# Patient Record
Sex: FEMALE | Race: WHITE | NOT HISPANIC OR LATINO | Employment: UNEMPLOYED | URBAN - METROPOLITAN AREA
[De-identification: names, ages, dates, MRNs, and addresses within clinical notes are randomized per-mention and may not be internally consistent; named-entity substitution may affect disease eponyms.]

---

## 2017-03-15 ENCOUNTER — HOSPITAL ENCOUNTER (OUTPATIENT)
Dept: RADIOLOGY | Facility: HOSPITAL | Age: 47
Discharge: HOME | End: 2017-03-15
Attending: NURSE PRACTITIONER
Payer: COMMERCIAL

## 2017-03-15 DIAGNOSIS — G35 MULTIPLE SCLEROSIS: ICD-10-CM

## 2017-03-15 PROCEDURE — 70553 MRI BRAIN STEM W/O & W/DYE: CPT | Mod: 26,,, | Performed by: RADIOLOGY

## 2017-03-15 PROCEDURE — 70553 MRI BRAIN STEM W/O & W/DYE: CPT | Mod: TC

## 2017-03-15 PROCEDURE — 25500020 PHARM REV CODE 255: Performed by: NURSE PRACTITIONER

## 2017-03-15 PROCEDURE — A9585 GADOBUTROL INJECTION: HCPCS | Performed by: NURSE PRACTITIONER

## 2017-03-15 RX ORDER — GADOBUTROL 604.72 MG/ML
6 INJECTION INTRAVENOUS
Status: COMPLETED | OUTPATIENT
Start: 2017-03-15 | End: 2017-03-15

## 2017-03-15 RX ADMIN — GADOBUTROL 6 ML: 604.72 INJECTION INTRAVENOUS at 09:03

## 2017-03-31 ENCOUNTER — OFFICE VISIT (OUTPATIENT)
Dept: OBSTETRICS AND GYNECOLOGY | Facility: CLINIC | Age: 47
End: 2017-03-31
Payer: COMMERCIAL

## 2017-03-31 VITALS
RESPIRATION RATE: 13 BRPM | HEART RATE: 70 BPM | WEIGHT: 154 LBS | DIASTOLIC BLOOD PRESSURE: 80 MMHG | HEIGHT: 64 IN | SYSTOLIC BLOOD PRESSURE: 122 MMHG | BODY MASS INDEX: 26.29 KG/M2

## 2017-03-31 DIAGNOSIS — N92.0 MENORRHAGIA WITH REGULAR CYCLE: Primary | ICD-10-CM

## 2017-03-31 PROCEDURE — 99204 OFFICE O/P NEW MOD 45 MIN: CPT | Mod: S$GLB,,, | Performed by: OBSTETRICS & GYNECOLOGY

## 2017-03-31 PROCEDURE — 1160F RVW MEDS BY RX/DR IN RCRD: CPT | Mod: S$GLB,,, | Performed by: OBSTETRICS & GYNECOLOGY

## 2017-03-31 PROCEDURE — 99999 PR PBB SHADOW E&M-EST. PATIENT-LVL III: CPT | Mod: PBBFAC,,, | Performed by: OBSTETRICS & GYNECOLOGY

## 2017-03-31 RX ORDER — NORGESTIMATE AND ETHINYL ESTRADIOL 0.25-0.035
KIT ORAL
Qty: 28 TABLET | Refills: 0 | Status: SHIPPED | OUTPATIENT
Start: 2017-03-31 | End: 2017-09-05

## 2017-03-31 RX ORDER — ZOLPIDEM TARTRATE 10 MG/1
0.5 TABLET ORAL NIGHTLY PRN
COMMUNITY
Start: 2017-03-08 | End: 2020-06-05 | Stop reason: SDUPTHER

## 2017-03-31 RX ORDER — PANTOPRAZOLE SODIUM 40 MG/1
1 TABLET, DELAYED RELEASE ORAL 2 TIMES DAILY
COMMUNITY
Start: 2017-03-08 | End: 2018-05-07

## 2017-03-31 NOTE — PROGRESS NOTES
Subjective:       Patient ID: Ruma Dietrich is a 47 y.o. female.    Chief Complaint:  Metrorrhagia (bleeding on/off 3 wks, heavy and light) and Hot Flashes      History of Present Illness  Patient presents complaining of irregular vaginal bleeding.  Patient states this episode is been going on for several months.  She states her periods are approximately 3 weeks apart usually about 20 days.  She states that bleeding typically will last 5 days and have 2-3 heavy days.  Patient states recently she has been bleeding for in excess of 7 days and her last cycle was only 2 weeks apart and she is currently still bleeding.  Patient states her menstrual problems have gone back since just after the birth of her child 3 years ago.  Patient states that prior to this delivery her periods were very regular like clockwork.  Patient is status post tubal ligation.  Patient states that she was having what she thought was hot flashes but had blood work done and has a normal FSH.    Menstrual History:  OB History      Para Term  AB TAB SAB Ectopic Multiple Living    4 2 2  1  1   2         Menarche age:   Patient's last menstrual period was 03/10/2017 (approximate).         Review of Systems  Review of Systems   Constitutional: Negative for activity change, appetite change, chills, diaphoresis, fatigue, fever and unexpected weight change.   HENT: Negative for congestion, dental problem, drooling, ear discharge, ear pain, facial swelling, hearing loss, mouth sores, nosebleeds, postnasal drip, rhinorrhea, sinus pressure, sneezing, sore throat, tinnitus, trouble swallowing and voice change.    Eyes: Negative for photophobia, pain, discharge, redness, itching and visual disturbance.   Respiratory: Negative for apnea, cough, choking, chest tightness, shortness of breath, wheezing and stridor.    Cardiovascular: Negative for chest pain, palpitations and leg swelling.   Gastrointestinal: Negative for abdominal distention,  abdominal pain, anal bleeding, blood in stool, constipation, diarrhea, nausea, rectal pain and vomiting.   Endocrine: Negative for cold intolerance, heat intolerance, polydipsia, polyphagia and polyuria.   Genitourinary: Negative for decreased urine volume, difficulty urinating, dyspareunia, dysuria, enuresis, flank pain, frequency, genital sores, hematuria, menstrual problem, pelvic pain, urgency, vaginal bleeding, vaginal discharge and vaginal pain.   Musculoskeletal: Positive for gait problem. Negative for arthralgias, back pain, joint swelling, myalgias, neck pain and neck stiffness.   Skin: Negative for color change, pallor, rash and wound.   Allergic/Immunologic: Negative for environmental allergies, food allergies and immunocompromised state.   Neurological: Negative for dizziness, tremors, seizures, syncope, facial asymmetry, speech difficulty, weakness, light-headedness, numbness and headaches.   Hematological: Negative for adenopathy. Does not bruise/bleed easily.   Psychiatric/Behavioral: Negative for agitation, behavioral problems, confusion, decreased concentration, dysphoric mood, hallucinations, self-injury, sleep disturbance and suicidal ideas. The patient is not nervous/anxious and is not hyperactive.            Objective:    Physical Exam   Constitutional: She is oriented to person, place, and time. She appears well-developed and well-nourished.   Neck: No thyromegaly present.   Cardiovascular: Normal rate and regular rhythm.    Pulmonary/Chest: Effort normal and breath sounds normal.   Abdominal: Soft. Bowel sounds are normal. She exhibits no mass. There is no tenderness. Hernia confirmed negative in the right inguinal area and confirmed negative in the left inguinal area.   Genitourinary: Uterus normal. Rectal exam shows no external hemorrhoid. No breast tenderness or discharge. Uterus is not enlarged and not tender. Cervix exhibits no motion tenderness, no discharge and no friability. Right  adnexum displays no mass, no tenderness and no fullness. Left adnexum displays no mass, no tenderness and no fullness. There is bleeding ( Moderate menstrual bleeding) in the vagina. No tenderness in the vagina. No foreign body in the vagina. No vaginal discharge found.   Musculoskeletal: Normal range of motion.   Lymphadenopathy:        Right: No inguinal adenopathy present.        Left: No inguinal adenopathy present.   Neurological: She is alert and oriented to person, place, and time. She has normal reflexes.   Skin: Skin is dry.   Psychiatric: She has a normal mood and affect. Her behavior is normal. Judgment and thought content normal.   Nursing note and vitals reviewed.        Assessment:        1. Menorrhagia with regular cycle               Plan:     patient was counseled on birth control pills versus ablation versus hysterectomy.  Patient is considering all options    Ruma was seen today for metrorrhagia and hot flashes.    Diagnoses and all orders for this visit:    Menorrhagia with regular cycle

## 2017-03-31 NOTE — MR AVS SNAPSHOT
[facility]  [address] [place] [id number]  Phone: [phone]                  Ruma Dietrich   3/31/2017 10:15 AM   Office Visit    Description:  Female : 1970   Provider:  Mathieu Guzman MD   Department:  [facility]           Reason for Visit     Metrorrhagia     Hot Flashes           Diagnoses this Visit        Comments    Menorrhagia with regular cycle    -  Primary            To Do List           Future Appointments        Provider Department Dept Phone    2017 9:00 AM Jose Eduardo Menon MD [facility] [phone]      Goals (5 Years of Data)     None       These Medications        Disp Refills Start End    norgestimate-ethinyl estradiol (SPRINTEC, 28,) 0.25-35 mg-mcg per tablet 28 tablet 0 3/31/2017     3 pills per day for 3 days then 2 pills per day for 3 days then 1 a day until the package is gone    Pharmacy: [id number] - [address] #: [phone]         [facility]     [facility] Nurse Care Line -  Assistance  Unless otherwise directed by your provider, please contact [facility], our nurse care line that is available for  assistance.     Registered nurses in the [facility] provide: appointment scheduling, clinical advisement, health education, and other advisory services.  Call: [phone] (toll free)               Medications           Message regarding Medications     Verify the changes and/or additions to your medication regime listed below are the same as discussed with your clinician today.  If any of these changes or additions are incorrect, please notify your healthcare provider.        START taking these NEW medications        Refills    norgestimate-ethinyl estradiol (SPRINTEC, 28,) 0.25-35 mg-mcg per tablet 0    Sig: 3 pills per day for 3 days then 2 pills per day for 3 days then 1 a day until the package is gone    Class: Normal           Verify  "that the below list of medications is an accurate representation of the medications you are currently taking.  If none reported, the list may be blank. If incorrect, please contact your healthcare provider. Carry this list with you in case of emergency.           Current Medications     norgestimate-ethinyl estradiol (SPRINTEC, 28,) 0.25-35 mg-mcg per tablet 3 pills per day for 3 days then 2 pills per day for 3 days then 1 a day until the package is gone    pantoprazole (PROTONIX) 40 MG tablet Take 1 tablet by mouth 2 (two) times daily.    zolpidem (AMBIEN) 10 mg Tab Take 1 tablet by mouth nightly as needed.           Clinical Reference Information           Your Vitals Were     BP Pulse Resp Height Weight Last Period    122/80 70 13 5' 3.5" (1.613 m) 69.9 kg (154 lb) 03/10/2017 (Approximate)    BMI                26.85 kg/m2          Blood Pressure          Most Recent Value    BP  122/80      Allergies as of 3/31/2017     Clarithromycin    Sulfa (Sulfonamide Antibiotics)    Penicillins      Immunizations Administered on Date of Encounter - 3/31/2017     None      [facility] Sign-Up     Activating your [facility] account is as easy as 1-2-3!     1) Visit [email or web address], select Sign Up Now, enter this activation code and your date of birth, then select Next.  [email or web address]  Expires: 5/15/2017 10:29 AM      2) Create a username and password to use when you visit [facility] in the future and select a security question in case you lose your password and select Next.    3) Enter your e-mail address and click Sign Up!    Additional Information  If you have questions, please e-mail [email or web address] or call [phone] to talk to our [facility] staff. Remember, [facility] is NOT to be used for urgent needs. For medical emergencies, dial 911.         Language Assistance Services     ATTENTION: Language assistance services are available, free of charge. Please call [phone].      ATENCIÓN: Si lazarus turner " a pagan disposición servicios gratuitos de asistencia lingüística. Llrebecca al [phone].     NAOMY Ý: N?u b?n nói Ti?ng Vi?t, có các d?ch v? h? tr? ngôn ng? mi?n phí dành cho b?n. G?i s? [phone].         [facility] complies with applicable Federal civil rights laws and does not discriminate on the basis of race, color, national origin, age, disability, or sex.

## 2017-08-31 ENCOUNTER — TELEPHONE (OUTPATIENT)
Dept: OBSTETRICS AND GYNECOLOGY | Facility: CLINIC | Age: 47
End: 2017-08-31

## 2017-08-31 DIAGNOSIS — N92.0 MENORRHAGIA WITH REGULAR CYCLE: Primary | ICD-10-CM

## 2017-08-31 NOTE — TELEPHONE ENCOUNTER
Pt is requesting an ablation.  Please place case request.  LOV if 3/31/2017 procedure was discussed.

## 2017-08-31 NOTE — TELEPHONE ENCOUNTER
----- Message from Addie Joseph MA sent at 8/31/2017  4:16 PM CDT -----  Contact: [phone]  Please call to schedule pre-op for Ablation with Dr Guzman.  Stated spoke to him about this at appt in March.  Please call her at [phone].  Stated she would like something either tomorrow 09/01/17 or Tuesday 09/05/17.  Thanks!

## 2017-09-05 ENCOUNTER — HOSPITAL ENCOUNTER (OUTPATIENT)
Dept: PREADMISSION TESTING | Facility: HOSPITAL | Age: 47
Discharge: HOME | End: 2017-09-05
Attending: OBSTETRICS & GYNECOLOGY
Payer: COMMERCIAL

## 2017-09-05 ENCOUNTER — OFFICE VISIT (OUTPATIENT)
Dept: OBSTETRICS AND GYNECOLOGY | Facility: CLINIC | Age: 47
End: 2017-09-05
Payer: COMMERCIAL

## 2017-09-05 ENCOUNTER — HOSPITAL ENCOUNTER (OUTPATIENT)
Dept: RADIOLOGY | Facility: HOSPITAL | Age: 47
Discharge: HOME | End: 2017-09-05
Attending: OBSTETRICS & GYNECOLOGY
Payer: COMMERCIAL

## 2017-09-05 ENCOUNTER — HOSPITAL ENCOUNTER (OUTPATIENT)
Dept: PULMONOLOGY | Facility: HOSPITAL | Age: 47
Discharge: HOME | End: 2017-09-05
Attending: OBSTETRICS & GYNECOLOGY
Payer: COMMERCIAL

## 2017-09-05 ENCOUNTER — ANESTHESIA EVENT (OUTPATIENT)
Dept: SURGERY | Facility: HOSPITAL | Age: 47
End: 2017-09-05
Payer: COMMERCIAL

## 2017-09-05 ENCOUNTER — LAB VISIT (OUTPATIENT)
Dept: LAB | Facility: HOSPITAL | Age: 47
End: 2017-09-05
Attending: OBSTETRICS & GYNECOLOGY
Payer: COMMERCIAL

## 2017-09-05 VITALS
HEIGHT: 63 IN | RESPIRATION RATE: 13 BRPM | WEIGHT: 156 LBS | DIASTOLIC BLOOD PRESSURE: 80 MMHG | SYSTOLIC BLOOD PRESSURE: 122 MMHG | BODY MASS INDEX: 27.64 KG/M2 | HEART RATE: 70 BPM

## 2017-09-05 VITALS — BODY MASS INDEX: 27.29 KG/M2 | WEIGHT: 154 LBS | HEIGHT: 63 IN

## 2017-09-05 DIAGNOSIS — Z01.818 PREOP TESTING: ICD-10-CM

## 2017-09-05 DIAGNOSIS — N92.0 MENORRHAGIA WITH REGULAR CYCLE: Primary | ICD-10-CM

## 2017-09-05 DIAGNOSIS — N92.0 MENORRHAGIA WITH REGULAR CYCLE: ICD-10-CM

## 2017-09-05 DIAGNOSIS — D64.9 ANEMIA, UNSPECIFIED TYPE: ICD-10-CM

## 2017-09-05 LAB
ABO + RH BLD: NORMAL
B-HCG UR QL: NEGATIVE
BASOPHILS # BLD AUTO: 0.07 K/UL
BASOPHILS NFR BLD: 0.8 %
BLD GP AB SCN CELLS X3 SERPL QL: NORMAL
DIFFERENTIAL METHOD: NORMAL
EOSINOPHIL # BLD AUTO: 0.1 K/UL
EOSINOPHIL NFR BLD: 1.5 %
ERYTHROCYTE [DISTWIDTH] IN BLOOD BY AUTOMATED COUNT: 12.7 %
HCT VFR BLD AUTO: 42.5 %
HGB BLD-MCNC: 14.4 G/DL
LYMPHOCYTES # BLD AUTO: 2.9 K/UL
LYMPHOCYTES NFR BLD: 33.8 %
MCH RBC QN AUTO: 30.8 PG
MCHC RBC AUTO-ENTMCNC: 33.9 G/DL
MCV RBC AUTO: 91 FL
MONOCYTES # BLD AUTO: 0.5 K/UL
MONOCYTES NFR BLD: 6 %
NEUTROPHILS # BLD AUTO: 4.9 K/UL
NEUTROPHILS NFR BLD: 57.9 %
PLATELET # BLD AUTO: 229 K/UL
PMV BLD AUTO: 11.3 FL
RBC # BLD AUTO: 4.67 M/UL
WBC # BLD AUTO: 8.51 K/UL

## 2017-09-05 PROCEDURE — 86850 RBC ANTIBODY SCREEN: CPT

## 2017-09-05 PROCEDURE — 71010 XR CHEST 1 VIEW PRE-OP: CPT | Mod: 26,,, | Performed by: RADIOLOGY

## 2017-09-05 PROCEDURE — 71010 XR CHEST 1 VIEW PRE-OP: CPT | Mod: TC

## 2017-09-05 PROCEDURE — 99999 PR PBB SHADOW E&M-EST. PATIENT-LVL III: CPT | Mod: PBBFAC,,, | Performed by: OBSTETRICS & GYNECOLOGY

## 2017-09-05 PROCEDURE — 36415 COLL VENOUS BLD VENIPUNCTURE: CPT

## 2017-09-05 PROCEDURE — 85025 COMPLETE CBC W/AUTO DIFF WBC: CPT

## 2017-09-05 PROCEDURE — 93005 ELECTROCARDIOGRAM TRACING: CPT

## 2017-09-05 PROCEDURE — 99499 UNLISTED E&M SERVICE: CPT | Mod: S$GLB,,, | Performed by: OBSTETRICS & GYNECOLOGY

## 2017-09-05 PROCEDURE — 86900 BLOOD TYPING SEROLOGIC ABO: CPT

## 2017-09-05 PROCEDURE — 81025 URINE PREGNANCY TEST: CPT

## 2017-09-05 RX ORDER — SODIUM CHLORIDE, SODIUM LACTATE, POTASSIUM CHLORIDE, CALCIUM CHLORIDE 600; 310; 30; 20 MG/100ML; MG/100ML; MG/100ML; MG/100ML
INJECTION, SOLUTION INTRAVENOUS CONTINUOUS
Status: CANCELLED | OUTPATIENT
Start: 2017-09-05

## 2017-09-05 NOTE — DISCHARGE INSTRUCTIONS
[facility]  Pre Admit Instructions    Day and Date of Procedure: Friday 9-8-2017      · Call your doctor if you become ill before your surgery  · Someone will call you between 1 p.m. And 5 p.m.the workday before the procedure to give you an arrival time       - Before 7 a.m. Enter through Emergency Room       - 7 a.m. To 5 p.m. Enter through Patient Registration Main Lobby  · You must have a responsible  to bring you home    Do NOT eat or drink anything   past midnight before your procedure day    Please    · Do not wear makeup, jewelry, nail polish or body piercings  · Bring containers/solution for contacts, dentures, bridges - these and hearing aids will be removed before your procedure  · Do not bring cash, jewelry or valuables the day of your procedure   · No smoking at least 24 hours before your procedure  · Wear clothing that is comfortable and easy to take off and put on  · Do NOT shave for at least 5 days before your surgery    Review skin preparation handout before using. Shower with Hibiclens the Night before the procedure. Bring remaining Hibiclens with you the morning of surgery.                Information about your stay (Please Review)    Before Surgery  1. Cafeteria Meals: 7am to 10am; 11am to 1:30 pm; Dinner/Supper must may be ordered between 11:00 am and 4 pm from the [facility] After [facility] Menu. Food will be available to  between 5 pm and 6 pm. The kitchen phone extension is [phone].  2. Your doctor may order and review labs, x-rays, ECG or other tests as a pre-surgery workup and will call you if there is need for follow up.  3. No smoking inside or outside the hospital on hospital grounds.  4. Wear clothing that is easy to take off and put on.  The hospital will provide you with a gown.  5. You may bring robe, slippers, nightwear, and toiletries (toothbrush, toothpaste, makeup).  6. If your doctor orders a Fleets Enema or other prep, follow package and/or doctors  orders.  7. Brush your teeth and rinse your mouth the morning of surgery, but dont swallow the water.  8. The nurse will ask questions and check your condition.  The doctor may hammad your surgical site.  9. Compression boots may be put on your calves to reduce the risk of blood clots.  10. The doctor may order medicine to help you relax before surgery.  After Surgery  1. The nurse will check your temperature, breathing, blood pressure, heart rate, IV site, and surgery site.  2. A diet will be ordered-most start with ice chips and then advance slowly to other foods.  3. If you have IV fluids the IV pump will beep to let the nurse know that she needs to check it.  4. You may have a urinary catheter and staff may measure your oral intake and urine output.  5. Pain medication may be ordered by the doctor after surgery.  If you have a pain management device tell your caretakers not to press the button because of OVERDOSE RISK.  6. When the nurse or doctor tells you it is okay to get out of bed, ask for help until you are stable.  7. The nurse may ask you to turn, cough, and deep breathe to prevent lung problems.  You can use a pillow to hold your incision when you deep breathe or cough to reduce pain.  8. The nurse will give you discharge instructions--incision care, symptoms to report to your doctor, and your follow-up appointment when you are discharged.  You cannot drive yourself home.  Goal for Discharge from One Day Surgery  · Control pain with an oral medication  · Walk without feeling dizzy or weak  · Tolerate liquids well  · Urinate without difficulty    Things you can do to  Reduce the Risk of Infections or Complications  Wash Hands and use Waterless Hand Sanitizers  · Wash hands frequently with soap and warm water for at least 15 seconds.   · Use hand sanitizers (alcohol based) often at home and in public if hands are not visibly soiled  Take Antibiotic Exactly as Prescribed  · Do not stop antibiotics too soon;  you risk developing infection resistant to antibiotics  · Take your antibiotic even if you are feeling better and even if they upset your stomach  · Call the doctor if you cant tolerate the antibiotic or you have an allergic reaction  Stay Healthy  · Take medicines as prescribed by your doctor  · Keep your diabetes under control - diet and medication  · Get enough rest, exercise and eat a healthy diet  Keep the Wound Clean and Dry  · Wash hands before and after taking care of the incision (cut)  · Wash hands when you remove a dressing, before you touch/apply a new dressing  · Shower and clean incision with antibacterial soap and rinse well if the doctor approves  · Allow the cut to dry completely before putting on a clean dressing  · Do not touch the part of the bandage that will cover the incision  · Do not use ointments unless your doctor tells you to-can promote bacterial growth  · If ordered, put ointment directly on the dressing-do not touch the end of the tube  · Do not scrub, remove scabs, or leave a damp dressing on the incision  · Do not use peroxide or alcohol to clean the incision unless the doctor tells you to   · Do not let children, pets or anyone else contaminate the incision  Stop Smoking To Prevent Infection  · Stop smoking-Centers for Disease Control recommends 30 days before surgery  · Smokers get more infections after surgery-studies have shown 6 times the risk  · Smokers have more scarring and heal slower-open wounds get infected easier  Prevent Respiratory complications  · Stop smoking  · Turn, cough, and deep breathe even if you have some pain when you do so.  · Splint your incision with a pillow when you cough/deep breath, to help control pain.  · Do not lie in one position for long periods of time.   Prevent Blood Clots  · When you wake move your legs, flex your feet, rotate your ankles, wiggle your toes  · Get up when the doctor says its ok.  Dangle your feet from the side of the  bed  · Report symptoms-leg pain, redness/swelling, warm to touch; fever; shortness of breath, chest pain, severe upper back pain.

## 2017-09-05 NOTE — PROGRESS NOTES
Subjective:       Patient ID: Ruma Dietrich is a 47 y.o. female.    Chief Complaint:  Pre-op Exam (sched 17--Ablation)      History of Present Illness  Patient presents for preop for D&C and ablation.Patient presents complaining of irregular vaginal bleeding.  Patient states this episode is been going on for several months.  She states her periods are approximately 3 weeks apart usually about 20 days.  She states that bleeding typically will last 5 days and have 2-3 heavy days.  Patient states recently she has been bleeding for in excess of 7 days and her last cycle was only 2 weeks apart and she is currently still bleeding.  Patient states her menstrual problems have gone back since just after the birth of her child 3 years ago.  Patient states that prior to this delivery her periods were very regular like clockwork.  Patient is status post tubal ligation.  Patient states that she was having what she thought was hot flashes but had blood work done and has a normal FSH.  Informed consents obtained    Menstrual History:  OB History      Para Term  AB Living    4 2 2   1 2    SAB TAB Ectopic Multiple Live Births    1       2         Menarche age:   Patient's last menstrual period was 2017 (approximate).         Review of Systems  Review of Systems   Constitutional: Negative for activity change, appetite change, chills, diaphoresis, fatigue, fever and unexpected weight change.   HENT: Positive for postnasal drip. Negative for congestion, dental problem, drooling, ear discharge, ear pain, facial swelling, hearing loss, mouth sores, nosebleeds, rhinorrhea, sinus pressure, sneezing, sore throat, tinnitus, trouble swallowing and voice change.    Eyes: Negative for photophobia, pain, discharge, redness, itching and visual disturbance.   Respiratory: Negative for apnea, cough, choking, chest tightness, shortness of breath, wheezing and stridor.    Cardiovascular: Negative for chest pain, palpitations  and leg swelling.   Gastrointestinal: Negative for abdominal distention, abdominal pain, anal bleeding, blood in stool, constipation, diarrhea, nausea, rectal pain and vomiting.   Endocrine: Negative for cold intolerance, heat intolerance, polydipsia, polyphagia and polyuria.   Genitourinary: Negative for decreased urine volume, difficulty urinating, dyspareunia, dysuria, enuresis, flank pain, frequency, genital sores, hematuria, menstrual problem, pelvic pain, urgency, vaginal bleeding, vaginal discharge and vaginal pain.   Musculoskeletal: Negative for arthralgias, back pain, gait problem, joint swelling, myalgias, neck pain and neck stiffness.   Skin: Negative for color change, pallor, rash and wound.   Allergic/Immunologic: Negative for environmental allergies, food allergies and immunocompromised state.   Neurological: Negative for dizziness, tremors, seizures, syncope, facial asymmetry, speech difficulty, weakness, light-headedness, numbness and headaches.   Hematological: Negative for adenopathy. Does not bruise/bleed easily.   Psychiatric/Behavioral: Negative for agitation, behavioral problems, confusion, decreased concentration, dysphoric mood, hallucinations, self-injury, sleep disturbance and suicidal ideas. The patient is not nervous/anxious and is not hyperactive.            Objective:    Physical Exam   Constitutional: She is oriented to person, place, and time. She appears well-developed and well-nourished.   Neck: No thyromegaly present.   Cardiovascular: Normal rate and regular rhythm.    Pulmonary/Chest: Effort normal and breath sounds normal.   Abdominal: Soft. Bowel sounds are normal. She exhibits no mass. There is no tenderness. Hernia confirmed negative in the right inguinal area and confirmed negative in the left inguinal area.   Genitourinary: Vagina normal and uterus normal. Rectal exam shows no external hemorrhoid. No breast tenderness or discharge. Uterus is not enlarged and not tender.  Cervix exhibits no motion tenderness, no discharge and no friability. Right adnexum displays no mass, no tenderness and no fullness. Left adnexum displays no mass, no tenderness and no fullness. No tenderness in the vagina. No foreign body in the vagina. No vaginal discharge found.   Musculoskeletal: Normal range of motion.   Lymphadenopathy:        Right: No inguinal adenopathy present.        Left: No inguinal adenopathy present.   Neurological: She is alert and oriented to person, place, and time. She has normal reflexes.   Skin: Skin is dry.   Psychiatric: She has a normal mood and affect. Her behavior is normal. Judgment and thought content normal.   Nursing note and vitals reviewed.        Assessment:        1. Menorrhagia with regular cycle    2. Preop testing               Plan:      Proceed with D&C and ablation on September 8, 2017   Ruma was seen today for pre-op exam.    Diagnoses and all orders for this visit:    Menorrhagia with regular cycle  -     CBC auto differential; Future  -     EKG 12-lead; Future  -     Pregnancy, urine rapid; Future  -     Type & Screen; Future  -     X-Ray Chest 1 View Pre-OP; Future  -     Up ad edison; Standing  -     Insert peripheral IV; Standing  -     Langston to Gravity; Standing  -     POCT glucose; Standing  -     Notify physician if BS > 180 for hysterectomy patients; Standing  -     Notify Physician/Vital Signs Parameters Urine output less than 0.5mL/kg/hr (with indwelling catheter) or 30 mL/hr (without indwelling catheter) or blood glucose greater than 200 mg/dL; Standing  -     Notify physician; Standing  -     Notify Physician - Potential Need of Opioid Reversal; Standing  -     Place in Outpatient; Standing    Preop testing  -     CBC auto differential; Future  -     EKG 12-lead; Future  -     Pregnancy, urine rapid; Future  -     Type & Screen; Future  -     X-Ray Chest 1 View Pre-OP; Future    Other orders  -     lactated ringers infusion; Inject into the vein  continuous.  -     Low Risk of VTE; Standing

## 2017-09-05 NOTE — ANESTHESIA PREPROCEDURE EVALUATION
09/05/2017  Ruma Dietrich is a 47 y.o., female.    Anesthesia Evaluation    I have reviewed the Patient Summary Reports.    I have reviewed the Nursing Notes.   I have reviewed the Medications.     Review of Systems  Anesthesia Hx:  No problems with previous Anesthesia Denies Hx of Anesthetic complications  History of prior surgery of interest to airway management or planning: Previous anesthesia: General, Epidural, MAC   Social:  Non-Smoker, No Alcohol Use    Hematology/Oncology:  Hematology Normal   Oncology Normal     EENT/Dental:EENT/Dental Normal   Cardiovascular:   Exercise tolerance: good    Pulmonary:  Pulmonary Normal    Renal/:   Denies Chronic Renal Disease.     Hepatic/GI:  Hepatic/GI Normal    Musculoskeletal:  Musculoskeletal Normal    Neurological:  Neurology Normal    Endocrine:  Endocrine Normal    Dermatological:  Skin Normal    Psych:  Psychiatric Normal           Physical Exam  General:  Well nourished    Airway/Jaw/Neck:  Airway Findings: Mouth Opening: Normal Tongue: Normal  General Airway Assessment: Adult  Mallampati: II  TM Distance: Normal, at least 6 cm  Jaw/Neck Findings:     Neck ROM: Normal ROM      Dental:  Dental Findings: In tact        Mental Status:  Mental Status Findings:  Cooperative, Alert and Oriented         Anesthesia Plan  Type of Anesthesia, risks & benefits discussed:  Anesthesia Type:  MAC, general  Patient's Preference:   Intra-op Monitoring Plan: standard ASA monitors  Intra-op Monitoring Plan Comments:   Post Op Pain Control Plan: multimodal analgesia  Post Op Pain Control Plan Comments:   Induction:   IV  Beta Blocker:  Patient is not currently on a Beta-Blocker (No further documentation required).       Informed Consent: Patient understands risks and agrees with Anesthesia plan.  Questions answered. Anesthesia consent signed with patient.  ASA Score: 2      Day of Surgery Review of History & Physical: I have interviewed and examined the patient. I have reviewed the patient's H&P dated: 9/8/17. There are no significant changes.  H&P update referred to the surgeon.         Ready For Surgery From Anesthesia Perspective.

## 2017-09-08 ENCOUNTER — HOSPITAL ENCOUNTER (OUTPATIENT)
Facility: HOSPITAL | Age: 47
Discharge: HOME | End: 2017-09-08
Attending: OBSTETRICS & GYNECOLOGY | Admitting: OBSTETRICS & GYNECOLOGY
Payer: COMMERCIAL

## 2017-09-08 ENCOUNTER — ANESTHESIA (OUTPATIENT)
Dept: SURGERY | Facility: HOSPITAL | Age: 47
End: 2017-09-08
Payer: COMMERCIAL

## 2017-09-08 DIAGNOSIS — N92.0 MENORRHAGIA WITH REGULAR CYCLE: Primary | ICD-10-CM

## 2017-09-08 DIAGNOSIS — D64.9 ANEMIA, UNSPECIFIED TYPE: ICD-10-CM

## 2017-09-08 LAB — POCT GLUCOSE: 92 MG/DL (ref 70–110)

## 2017-09-08 PROCEDURE — 88305 TISSUE EXAM BY PATHOLOGIST: CPT | Performed by: PATHOLOGY

## 2017-09-08 PROCEDURE — 27200120 HC KIT IV START (RUSH ONLY): Performed by: NURSE ANESTHETIST, CERTIFIED REGISTERED

## 2017-09-08 PROCEDURE — 25000003 PHARM REV CODE 250: Performed by: NURSE ANESTHETIST, CERTIFIED REGISTERED

## 2017-09-08 PROCEDURE — 82962 GLUCOSE BLOOD TEST: CPT

## 2017-09-08 PROCEDURE — 27201423 OPTIME MED/SURG SUP & DEVICES STERILE SUPPLY: Performed by: OBSTETRICS & GYNECOLOGY

## 2017-09-08 PROCEDURE — 36000707: Performed by: OBSTETRICS & GYNECOLOGY

## 2017-09-08 PROCEDURE — 71000033 HC RECOVERY, INTIAL HOUR: Performed by: OBSTETRICS & GYNECOLOGY

## 2017-09-08 PROCEDURE — 37000009 HC ANESTHESIA EA ADD 15 MINS: Performed by: OBSTETRICS & GYNECOLOGY

## 2017-09-08 PROCEDURE — 27000494 *HC OXYGEN SET UP (STAH ONLY): Performed by: NURSE ANESTHETIST, CERTIFIED REGISTERED

## 2017-09-08 PROCEDURE — 63600175 PHARM REV CODE 636 W HCPCS: Performed by: NURSE ANESTHETIST, CERTIFIED REGISTERED

## 2017-09-08 PROCEDURE — 58353 ENDOMETR ABLATE THERMAL: CPT | Mod: ,,, | Performed by: OBSTETRICS & GYNECOLOGY

## 2017-09-08 PROCEDURE — 00940 ANES VAGINAL PX NOS: CPT | Mod: P2,QZ | Performed by: NURSE ANESTHETIST, CERTIFIED REGISTERED

## 2017-09-08 PROCEDURE — 25000003 PHARM REV CODE 250: Performed by: OBSTETRICS & GYNECOLOGY

## 2017-09-08 PROCEDURE — 37000008 HC ANESTHESIA 1ST 15 MINUTES: Performed by: OBSTETRICS & GYNECOLOGY

## 2017-09-08 PROCEDURE — 27000495 *HC ANESTHESIA START UP SUPPLY KIT (STAH ONLY): Performed by: NURSE ANESTHETIST, CERTIFIED REGISTERED

## 2017-09-08 PROCEDURE — 36000706: Performed by: OBSTETRICS & GYNECOLOGY

## 2017-09-08 RX ORDER — DIPHENHYDRAMINE HYDROCHLORIDE 50 MG/ML
25 INJECTION INTRAMUSCULAR; INTRAVENOUS EVERY 6 HOURS PRN
Status: DISCONTINUED | OUTPATIENT
Start: 2017-09-08 | End: 2017-09-08 | Stop reason: HOSPADM

## 2017-09-08 RX ORDER — SODIUM CHLORIDE, SODIUM LACTATE, POTASSIUM CHLORIDE, CALCIUM CHLORIDE 600; 310; 30; 20 MG/100ML; MG/100ML; MG/100ML; MG/100ML
INJECTION, SOLUTION INTRAVENOUS CONTINUOUS
Status: DISCONTINUED | OUTPATIENT
Start: 2017-09-08 | End: 2017-09-08 | Stop reason: HOSPADM

## 2017-09-08 RX ORDER — OXYCODONE AND ACETAMINOPHEN 5; 325 MG/1; MG/1
1 TABLET ORAL EVERY 4 HOURS PRN
Status: DISCONTINUED | OUTPATIENT
Start: 2017-09-08 | End: 2017-09-08 | Stop reason: HOSPADM

## 2017-09-08 RX ORDER — FENTANYL CITRATE 50 UG/ML
INJECTION, SOLUTION INTRAMUSCULAR; INTRAVENOUS
Status: DISCONTINUED | OUTPATIENT
Start: 2017-09-08 | End: 2017-09-08

## 2017-09-08 RX ORDER — OXYCODONE AND ACETAMINOPHEN 5; 325 MG/1; MG/1
1 TABLET ORAL EVERY 4 HOURS PRN
Qty: 5 TABLET | Refills: 0 | Status: SHIPPED | OUTPATIENT
Start: 2017-09-08 | End: 2017-09-22

## 2017-09-08 RX ORDER — PROPOFOL 10 MG/ML
VIAL (ML) INTRAVENOUS
Status: DISCONTINUED | OUTPATIENT
Start: 2017-09-08 | End: 2017-09-08

## 2017-09-08 RX ORDER — MIDAZOLAM HYDROCHLORIDE 1 MG/ML
INJECTION INTRAMUSCULAR; INTRAVENOUS
Status: DISCONTINUED | OUTPATIENT
Start: 2017-09-08 | End: 2017-09-08

## 2017-09-08 RX ORDER — ACETAMINOPHEN 10 MG/ML
INJECTION, SOLUTION INTRAVENOUS
Status: DISCONTINUED | OUTPATIENT
Start: 2017-09-08 | End: 2017-09-08

## 2017-09-08 RX ORDER — DEXAMETHASONE SODIUM PHOSPHATE 4 MG/ML
INJECTION, SOLUTION INTRA-ARTICULAR; INTRALESIONAL; INTRAMUSCULAR; INTRAVENOUS; SOFT TISSUE
Status: DISCONTINUED | OUTPATIENT
Start: 2017-09-08 | End: 2017-09-08

## 2017-09-08 RX ORDER — ONDANSETRON 2 MG/ML
4 INJECTION INTRAMUSCULAR; INTRAVENOUS DAILY PRN
Status: DISCONTINUED | OUTPATIENT
Start: 2017-09-08 | End: 2017-09-08 | Stop reason: HOSPADM

## 2017-09-08 RX ORDER — KETOROLAC TROMETHAMINE 15 MG/ML
15 INJECTION, SOLUTION INTRAMUSCULAR; INTRAVENOUS EVERY 8 HOURS PRN
Status: DISCONTINUED | OUTPATIENT
Start: 2017-09-08 | End: 2017-09-08 | Stop reason: HOSPADM

## 2017-09-08 RX ORDER — ONDANSETRON HYDROCHLORIDE 2 MG/ML
INJECTION, SOLUTION INTRAMUSCULAR; INTRAVENOUS
Status: DISCONTINUED | OUTPATIENT
Start: 2017-09-08 | End: 2017-09-08

## 2017-09-08 RX ORDER — SODIUM CHLORIDE 0.9 % (FLUSH) 0.9 %
3 SYRINGE (ML) INJECTION EVERY 8 HOURS
Status: DISCONTINUED | OUTPATIENT
Start: 2017-09-08 | End: 2017-09-08 | Stop reason: HOSPADM

## 2017-09-08 RX ORDER — LIDOCAINE HYDROCHLORIDE 20 MG/ML
INJECTION, SOLUTION EPIDURAL; INFILTRATION; INTRACAUDAL; PERINEURAL
Status: DISCONTINUED | OUTPATIENT
Start: 2017-09-08 | End: 2017-09-08

## 2017-09-08 RX ORDER — SODIUM CHLORIDE, SODIUM LACTATE, POTASSIUM CHLORIDE, CALCIUM CHLORIDE 600; 310; 30; 20 MG/100ML; MG/100ML; MG/100ML; MG/100ML
INJECTION, SOLUTION INTRAVENOUS CONTINUOUS PRN
Status: DISCONTINUED | OUTPATIENT
Start: 2017-09-08 | End: 2017-09-08

## 2017-09-08 RX ORDER — SODIUM CHLORIDE 0.9 % (FLUSH) 0.9 %
3 SYRINGE (ML) INJECTION
Status: DISCONTINUED | OUTPATIENT
Start: 2017-09-08 | End: 2017-09-08 | Stop reason: HOSPADM

## 2017-09-08 RX ADMIN — FENTANYL CITRATE 100 MCG: 50 INJECTION, SOLUTION INTRAMUSCULAR; INTRAVENOUS at 08:09

## 2017-09-08 RX ADMIN — PROPOFOL 50 MG: 10 INJECTION, EMULSION INTRAVENOUS at 08:09

## 2017-09-08 RX ADMIN — SODIUM CHLORIDE, SODIUM LACTATE, POTASSIUM CHLORIDE, AND CALCIUM CHLORIDE: .6; .31; .03; .02 INJECTION, SOLUTION INTRAVENOUS at 08:09

## 2017-09-08 RX ADMIN — DEXAMETHASONE SODIUM PHOSPHATE 4 MG: 4 INJECTION, SOLUTION INTRAMUSCULAR; INTRAVENOUS at 08:09

## 2017-09-08 RX ADMIN — MIDAZOLAM HYDROCHLORIDE 2 MG: 1 INJECTION, SOLUTION INTRAMUSCULAR; INTRAVENOUS at 08:09

## 2017-09-08 RX ADMIN — LIDOCAINE HYDROCHLORIDE 50 MG: 20 INJECTION, SOLUTION EPIDURAL; INFILTRATION; INTRACAUDAL; PERINEURAL at 08:09

## 2017-09-08 RX ADMIN — PROPOFOL 150 MG: 10 INJECTION, EMULSION INTRAVENOUS at 08:09

## 2017-09-08 RX ADMIN — ACETAMINOPHEN 1000 MG: 10 INJECTION, SOLUTION INTRAVENOUS at 08:09

## 2017-09-08 RX ADMIN — OXYCODONE HYDROCHLORIDE AND ACETAMINOPHEN 1 TABLET: 5; 325 TABLET ORAL at 10:09

## 2017-09-08 RX ADMIN — ONDANSETRON 4 MG: 2 INJECTION, SOLUTION INTRAMUSCULAR; INTRAVENOUS at 08:09

## 2017-09-08 NOTE — TRANSFER OF CARE
"Anesthesia Transfer of Care Note    Patient: Ruma Dietrich    Procedure(s) Performed: Procedure(s) (LRB):  DILATION-CURETTAGE OF UTERUS (D AND C) (N/A)  ABLATION ENDOMETRIAL THERMAL - NOVASURE (N/A)    Patient location: PACU    Anesthesia Type: general    Transport from OR: Transported from OR on 6-10 L/min O2 by face mask with adequate spontaneous ventilation    Post pain: adequate analgesia    Post assessment: no apparent anesthetic complications and tolerated procedure well    Post vital signs: stable    Level of consciousness: sedated    Nausea/Vomiting: no nausea/vomiting    Complications: none    Transfer of care protocol was followed      Last vitals:   Visit Vitals  /66 (BP Location: Left arm, Patient Position: Sitting)   Pulse 72   Temp 36.4 °C (97.5 °F) (Oral)   Resp 16   Ht 5' 3" (1.6 m)   Wt 69.9 kg (154 lb 1.6 oz)   LMP 08/19/2017 (Exact Date)   SpO2 99%   Breastfeeding? No   BMI 27.30 kg/m²     "

## 2017-09-08 NOTE — OP NOTE
Preop diagnosis  Menorrhagia with regular cycle [N92.0], anemia    Postop diagnosis Same    Procedure D&C and ablation    Surgeon Mathieu Guzman M.D.    Asst. None    Estimated blood loss less then 25 cc    Anesthesia MAC    Date 9/8/2017    Procedure in detail    After the appropriate informed consents were obtained and lab work found to be within normal limits the patient was taken to the operating room.  She was placed under MAC sedation and prepped and draped in the usual sterile fashion.  She was placed in to the universal stirrups.  A graves speculum was inserted inside the vagina for visualization of the cervix which was grasped in the anterior portion.  Next the uterus was sounded to 8 Centimeters .  A cervical length of 3.5 Centimeters was obtained.Leaving a cavity length of 4.5 centimeters Next the cervix was dilated open to 8 mm. Next   a sharp curet was passed into the uterus and all surfaces of the uterus were scraped.  A specimen was obtained and sent to pathology.      Following this the NovaSure device was used to obtain a cavity width.  The cavity width was 3.3 Centimeters.  Following this the instrument she was allowed to test.  Once it passed the test it was allowed to ablate.  It ablated at a power of 82 and for 1 minute and 25 Seconds.     Once the procedure was completed all instruments were removed from the vagina.  The patient was taken out of the universal [facility].  She was awakened from anesthesia.  She was sent to recovery room in stable condition.    Discharge summary    Once patient has met criteria she will go to [facility] where her recovery will continue.  When she is fully awake, urinating on own and tolerating liquids she be allowed to be discharged home.  She'll be instructed to take Aleve for pain to call with any problems and to follow up in our office in 2 week.    Discharge Note      SUMMARY     Admit Date: 9/8/2017    Attending Physician: Mathieu Guzman MD     Discharge  Physician: Mathieu Guzman MD    Discharge Date: 9/8/2017     Reason for Admission: D&C and ablation    Final Diagnoses:   Principal Problem: Menorrhagia with regular cycle   Secondary Diagnoses:   Active Hospital Problems    Diagnosis  POA    *Menorrhagia with regular cycle [N92.0]  Yes    Anemia [D64.9]  Yes      Resolved Hospital Problems    Diagnosis Date Resolved POA   No resolved problems to display.       Disposition: Home or Self Care    Procedures Performed: Procedure(s) (LRB):  DILATION-CURETTAGE OF UTERUS (D AND C) (N/A)  ABLATION ENDOMETRIAL THERMAL - NOVASURE (N/A)    Hospital Course: Patient presented and underwent D&C and ablation.  Postoperatively once she is fully awake and urinating on her own and tolerating liquids she be allowed to be discharged home.    Consults: none    Discharged Condition: good    Patient Instructions:   Current Discharge Medication List      START taking these medications    Details   oxycodone-acetaminophen (PERCOCET) 5-325 mg per tablet Take 1 tablet by mouth every 4 (four) hours as needed for Pain.  Qty: 5 tablet, Refills: 0         CONTINUE these medications which have NOT CHANGED    Details   pantoprazole (PROTONIX) 40 MG tablet Take 1 tablet by mouth 2 (two) times daily.      zolpidem (AMBIEN) 10 mg Tab Take 0.5 tablets by mouth nightly as needed.              Medications:  Reconciled Home Medications:   Current Discharge Medication List      START taking these medications    Details   oxycodone-acetaminophen (PERCOCET) 5-325 mg per tablet Take 1 tablet by mouth every 4 (four) hours as needed for Pain.  Qty: 5 tablet, Refills: 0         CONTINUE these medications which have NOT CHANGED    Details   pantoprazole (PROTONIX) 40 MG tablet Take 1 tablet by mouth 2 (two) times daily.      zolpidem (AMBIEN) 10 mg Tab Take 0.5 tablets by mouth nightly as needed.              Discharge Procedure Orders (must include Diet, Follow-up, Activity)  No discharge procedures on  file.     Follow-up Information     Archie Newman MD.    Specialty:  Family Medicine  Why:  outpatient services  Contact information:  [address] [facility]  [place] [id number] [phone]             Mathieu Guzman MD In 2 weeks.    Specialties:  Obstetrics, Obstetrics and Gynecology  Why:  post op follow up  Contact information:  [address] [facility] [address] [phone]

## 2017-09-08 NOTE — ANESTHESIA POSTPROCEDURE EVALUATION
"Anesthesia Post Evaluation    Patient: Ruma Dietrich    Procedure(s) Performed: Procedure(s) (LRB):  DILATION-CURETTAGE OF UTERUS (D AND C) (N/A)  ABLATION ENDOMETRIAL THERMAL - NOVASURE (N/A)    Final Anesthesia Type: general  Patient location during evaluation: PACU  Patient participation: Yes- Able to Participate  Level of consciousness: awake and alert, oriented and awake  Post-procedure vital signs: reviewed and stable  Pain management: adequate  Airway patency: patent  PONV status at discharge: No PONV  Anesthetic complications: no      Cardiovascular status: blood pressure returned to baseline, hemodynamically stable and stable  Respiratory status: unassisted, spontaneous ventilation and room air  Hydration status: euvolemic  Follow-up not needed.        Visit Vitals  /61 (BP Location: Left arm, Patient Position: Lying)   Pulse (!) 53   Temp 35.9 °C (96.6 °F) (Tympanic)   Resp 16   Ht 5' 3" (1.6 m)   Wt 69.9 kg (154 lb 1.6 oz)   LMP 08/19/2017 (Exact Date)   SpO2 100%   Breastfeeding? No   BMI 27.30 kg/m²       Pain/Carlota Score: Pain Assessment Performed: Yes (9/8/2017  9:41 AM)  Presence of Pain: complains of pain/discomfort (9/8/2017  9:21 AM)  Pain Rating Prior to Med Admin: 5 (9/8/2017 10:22 AM)  Carlota Score: 10 (9/8/2017  9:21 AM)      "

## 2017-09-08 NOTE — INTERVAL H&P NOTE
The patient has been examined and the H&P has been reviewed:        I concur with the findings and no changes have occurred since H&P was written.        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)       Past Surgical History:   Procedure Laterality Date    breast augmentation      BREAST SURGERY  2003    Breast Augmentations     SECTION  2010, 2013    KIDNEY SURGERY Right 1991    one kidney removed on right (had 4 kidneys)    kidneys      has 3 kidneys    TUBAL LIGATION Bilateral     URETER SURGERY Right     removed kidney attached to right ureter       Social History   Substance Use Topics    Smoking status: Former Smoker     Quit date: 1990    Smokeless tobacco: Never Used      Comment: did not every smoke every day-just in college when she went out    Alcohol use No       Family History   Problem Relation Age of Onset    Breast cancer Maternal Aunt 25    Hypertension Father     Coronary artery disease Father     Colon cancer Neg Hx     Ovarian cancer Neg Hx        Outpatient Prescriptions Marked as Taking for the 17 encounter (Hospital Encounter)   Medication Sig Dispense Refill    pantoprazole (PROTONIX) 40 MG tablet Take 1 tablet by mouth 2 (two) times daily.      zolpidem (AMBIEN) 10 mg Tab Take 0.5 tablets by mouth nightly as needed.          Review of patient's allergies indicates:   Allergen Reactions    Clarithromycin Hives and Rash     Other reaction(s): Hives    Penicillins Hives and Rash     Other reaction(s): Hives    Sulfa (sulfonamide antibiotics) Hives     Other reaction(s): whelps                 Anesthesia/Surgery risks, benefits and alternative options discussed and understood by patient/family.      Active Hospital Problems    Diagnosis  POA    Menorrhagia with regular cycle [N92.0]  Yes       Resolved Hospital Problems    Diagnosis Date Resolved POA   No resolved problems to display.

## 2017-09-08 NOTE — DISCHARGE INSTRUCTIONS
Endometrial Ablation  Endometrial ablation is an outpatient surgery that can reduce or stop heavy menstrual bleeding. Ablation destroys the lining of the uterus. This surgery is for women who do not want to have any more children and who have not yet entered menopause. It should not be used by women with endometrial hyperplasia or cancer of the uterus.  Treatment takes less than an hour, and you can go home later that day.    Your recovery  · You may have cramping or aching in your abdomen after surgery. Your health care provider can give you pain medicine.  · You may also have a bloody or watery discharge or bleeding for days or weeks. Use sanitary pads, not tampons.  · Dont have sexual intercourse or play active sports for 2 weeks after surgery.  · You can likely return to work in 2 days.  · Ask your health care provider about using contraception after an ablation.  · Your health care provider will see you in about 6 weeks to be sure youre healing well.    Call your health care provider if you have any of the following after surgery:  · Persistent or increased abdominal pain  · Shortness of breath  · Heavy vaginal bleeding  · Fever over 100.4°F (38°C) or chills  · Nausea  · Frequent urination for 24 hours   Date Last Reviewed: 5/10/2015  © 8367-4173 [employer]. [address] [place] [address], [place] [address]. All rights reserved. This information is not intended as a substitute for professional medical care. Always follow your healthcare professional's instructions.        Discharge Instructions for Dilation and Curettage (D and C)  Your doctor performed dilation and curettage (D&C). The reasons for having this procedure vary from person to person. The D&C may be done to control heavy uterine bleeding, to find the cause of irregular bleeding, to perform an , or to remove pregnancy tissue if you have had a miscarriage.    Home care  · Take it easy. Rest for 2 days as needed.  · Return to your  normal activities after 24 to 48 hours. You may also return to work at that time.  · Eat a normal diet.  · Take an over-the-counter pain reliever for pain, if needed.  · Remember, its OK to have bleeding for about a week after the procedure. The amount of bleeding should be similar to what you have during a normal period.  · Dont drive for 24 hours after the procedure unless specifically told by your provider that it is OK to do so.  · Dont have sexual intercourse or use tampons or douches until your doctor says its safe to do so.     When to call your doctor  Call your doctor right away if you have any of the following:  · Bleeding that soaks more than one sanitary pad in one hour  · Severe abdominal pain  · Severe cramps  · Fever above 100.4°F (38.0°C)  · A foul smelling vaginal discharge   Date Last Reviewed: 5/19/2015  © 1731-9106 [employer]. [address] [place] [address], [place] [address]. All rights reserved. This information is not intended as a substitute for professional medical care. Always follow your healthcare professional's instructions.

## 2017-09-08 NOTE — PLAN OF CARE
Problem: Patient Care Overview  Goal: Plan of Care Review  Outcome: Outcome(s) achieved Date Met: 09/08/17  Vitals stable. Complained of lower abdominal cramping; mostly relieved with Percocet. Voiding spontaneously. Fall precautions maintained; up with standby assistance. IV fluids continued until tolerating oral intake. Tolerating regular diet. Discharge instructions and med changes discussed with patient and spouse; verbalized understanding. Discharged home via wheelchair. Will follow up with Dr. Guzman in 2 weeks.

## 2017-09-08 NOTE — PROGRESS NOTES
Nursing Notes  Report received from Claudette, RN. Patient stable. Complaining of cramping; will address. Liquid tray ordered. Will continue to monitor.

## 2017-09-13 VITALS
WEIGHT: 154.13 LBS | BODY MASS INDEX: 27.31 KG/M2 | HEART RATE: 62 BPM | RESPIRATION RATE: 18 BRPM | DIASTOLIC BLOOD PRESSURE: 69 MMHG | OXYGEN SATURATION: 99 % | SYSTOLIC BLOOD PRESSURE: 112 MMHG | HEIGHT: 63 IN | TEMPERATURE: 97 F

## 2017-09-22 ENCOUNTER — OFFICE VISIT (OUTPATIENT)
Dept: OBSTETRICS AND GYNECOLOGY | Facility: CLINIC | Age: 47
End: 2017-09-22
Payer: COMMERCIAL

## 2017-09-22 VITALS — WEIGHT: 156 LBS | BODY MASS INDEX: 27.63 KG/M2

## 2017-09-22 DIAGNOSIS — Z09 POSTOPERATIVE FOLLOW-UP: Primary | ICD-10-CM

## 2017-09-22 PROCEDURE — 99999 PR PBB SHADOW E&M-EST. PATIENT-LVL II: CPT | Mod: PBBFAC,,, | Performed by: OBSTETRICS & GYNECOLOGY

## 2017-09-22 PROCEDURE — 99024 POSTOP FOLLOW-UP VISIT: CPT | Mod: S$GLB,,, | Performed by: OBSTETRICS & GYNECOLOGY

## 2017-09-22 NOTE — PROGRESS NOTES
Subjective:       Patient ID: Ruma Dietrich is a 47 y.o. female.    Chief Complaint:  Post-op Evaluation (2w D&C)      History of Present Illness  Patient presents for two-week postop follow-up from D&C and ablation.  Patient states she had cramping day of surgery but since then has been fine.  She has had minimal spotting since then.  She is otherwise without GYN complaints.  Pathology report was benign.    Menstrual History:  OB History      Para Term  AB Living    4 2 2   1 2    SAB TAB Ectopic Multiple Live Births    1       2         Menarche age:   Patient's last menstrual period was 2017 (exact date).         Review of Systems  Review of Systems   Constitutional: Negative for activity change, appetite change, chills, diaphoresis, fatigue, fever and unexpected weight change.   HENT: Negative for congestion, dental problem, drooling, ear discharge, ear pain, facial swelling, hearing loss, mouth sores, nosebleeds, postnasal drip, rhinorrhea, sinus pressure, sneezing, sore throat, tinnitus, trouble swallowing and voice change.    Eyes: Negative for photophobia, pain, discharge, redness, itching and visual disturbance.   Respiratory: Negative for apnea, cough, choking, chest tightness, shortness of breath, wheezing and stridor.    Cardiovascular: Negative for chest pain, palpitations and leg swelling.   Gastrointestinal: Negative for abdominal distention, abdominal pain, anal bleeding, blood in stool, constipation, diarrhea, nausea, rectal pain and vomiting.   Endocrine: Negative for cold intolerance, heat intolerance, polydipsia, polyphagia and polyuria.   Genitourinary: Negative for decreased urine volume, difficulty urinating, dyspareunia, dysuria, enuresis, flank pain, frequency, genital sores, hematuria, menstrual problem, pelvic pain, urgency, vaginal bleeding, vaginal discharge and vaginal pain.   Musculoskeletal: Negative for arthralgias, back pain, gait problem, joint swelling,  myalgias, neck pain and neck stiffness.   Skin: Negative for color change, pallor, rash and wound.   Allergic/Immunologic: Negative for environmental allergies, food allergies and immunocompromised state.   Neurological: Negative for dizziness, tremors, seizures, syncope, facial asymmetry, speech difficulty, weakness, light-headedness, numbness and headaches.   Hematological: Negative for adenopathy. Does not bruise/bleed easily.   Psychiatric/Behavioral: Negative for agitation, behavioral problems, confusion, decreased concentration, dysphoric mood, hallucinations, self-injury, sleep disturbance and suicidal ideas. The patient is not nervous/anxious and is not hyperactive.            Objective:    Physical Exam   Nursing note and vitals reviewed.      Abdomen soft nontender  Assessment:        1. Postoperative follow-up               Plan:        Ruma was seen today for post-op evaluation.    Diagnoses and all orders for this visit:    Postoperative follow-up

## 2017-10-10 ENCOUNTER — OFFICE VISIT (OUTPATIENT)
Dept: OBSTETRICS AND GYNECOLOGY | Facility: CLINIC | Age: 47
End: 2017-10-10
Payer: COMMERCIAL

## 2017-10-10 VITALS
HEART RATE: 72 BPM | HEIGHT: 64 IN | RESPIRATION RATE: 13 BRPM | DIASTOLIC BLOOD PRESSURE: 68 MMHG | SYSTOLIC BLOOD PRESSURE: 118 MMHG | BODY MASS INDEX: 26.8 KG/M2 | WEIGHT: 157 LBS

## 2017-10-10 DIAGNOSIS — Z01.419 ENCOUNTER FOR GYNECOLOGICAL EXAMINATION WITHOUT ABNORMAL FINDING: Primary | ICD-10-CM

## 2017-10-10 DIAGNOSIS — Z12.39 BREAST CANCER SCREENING: ICD-10-CM

## 2017-10-10 DIAGNOSIS — Z12.4 CERVICAL CANCER SCREENING: ICD-10-CM

## 2017-10-10 PROCEDURE — 88175 CYTOPATH C/V AUTO FLUID REDO: CPT

## 2017-10-10 PROCEDURE — 99396 PREV VISIT EST AGE 40-64: CPT | Mod: S$GLB,,, | Performed by: OBSTETRICS & GYNECOLOGY

## 2017-10-10 PROCEDURE — 99999 PR PBB SHADOW E&M-EST. PATIENT-LVL III: CPT | Mod: PBBFAC,,, | Performed by: OBSTETRICS & GYNECOLOGY

## 2017-10-10 RX ORDER — SPIRONOLACTONE 50 MG/1
1 TABLET, FILM COATED ORAL 2 TIMES DAILY
COMMUNITY
Start: 2017-08-31 | End: 2020-06-05

## 2017-10-10 RX ORDER — KETOCONAZOLE 20 MG/ML
1 SHAMPOO, SUSPENSION TOPICAL EVERY OTHER DAY
COMMUNITY
Start: 2017-08-31 | End: 2020-06-05

## 2017-10-10 NOTE — PROGRESS NOTES
Subjective:       Patient ID: Ruma Dietrich is a 47 y.o. female.    Chief Complaint:  Well Woman      History of Present Illness  Patient presents for annual exam.  Patient states she's not had any bleeding since her ablation couple of months ago.  Patient is interested in a mammogram.  She has these done in [facility].  She is otherwise without GYN complaints.    Menstrual History:  OB History      Para Term  AB Living    4 2 2   1 2    SAB TAB Ectopic Multiple Live Births    1       2         Menarche age:   Patient's last menstrual period was 2017 (approximate).         Review of Systems  Review of Systems   Constitutional: Negative for activity change, appetite change, chills, diaphoresis, fatigue, fever and unexpected weight change.   HENT: Negative for congestion, dental problem, drooling, ear discharge, ear pain, facial swelling, hearing loss, mouth sores, nosebleeds, postnasal drip, rhinorrhea, sinus pressure, sneezing, sore throat, tinnitus, trouble swallowing and voice change.    Eyes: Negative for photophobia, pain, discharge, redness, itching and visual disturbance.   Respiratory: Negative for apnea, cough, choking, chest tightness, shortness of breath, wheezing and stridor.    Cardiovascular: Negative for chest pain, palpitations and leg swelling.   Gastrointestinal: Negative for abdominal distention, abdominal pain, anal bleeding, blood in stool, constipation, diarrhea, nausea, rectal pain and vomiting.   Endocrine: Negative for cold intolerance, heat intolerance, polydipsia, polyphagia and polyuria.   Genitourinary: Negative for decreased urine volume, difficulty urinating, dyspareunia, dysuria, enuresis, flank pain, frequency, genital sores, hematuria, menstrual problem, pelvic pain, urgency, vaginal bleeding, vaginal discharge and vaginal pain.   Musculoskeletal: Negative for arthralgias, back pain, gait problem, joint swelling, myalgias, neck pain and neck stiffness.   Skin:  Negative for color change, pallor, rash and wound.   Allergic/Immunologic: Negative for environmental allergies, food allergies and immunocompromised state.   Neurological: Negative for dizziness, tremors, seizures, syncope, facial asymmetry, speech difficulty, weakness, light-headedness, numbness and headaches.   Hematological: Negative for adenopathy. Does not bruise/bleed easily.   Psychiatric/Behavioral: Negative for agitation, behavioral problems, confusion, decreased concentration, dysphoric mood, hallucinations, self-injury, sleep disturbance and suicidal ideas. The patient is not nervous/anxious and is not hyperactive.            Objective:    Physical Exam   Constitutional: She is oriented to person, place, and time. She appears well-developed and well-nourished.   Neck: No thyromegaly present.   Cardiovascular: Normal rate and regular rhythm.    Pulmonary/Chest: Effort normal and breath sounds normal. Right breast exhibits no inverted nipple, no mass, no nipple discharge, no skin change and no tenderness. Left breast exhibits no inverted nipple, no mass, no nipple discharge, no skin change and no tenderness. Breasts are symmetrical.   Abdominal: Soft. Bowel sounds are normal. She exhibits no mass. There is no tenderness. Hernia confirmed negative in the right inguinal area and confirmed negative in the left inguinal area.   Genitourinary: Vagina normal and uterus normal. Rectal exam shows no external hemorrhoid. No breast tenderness or discharge. Uterus is not enlarged and not tender. Cervix exhibits no motion tenderness, no discharge and no friability. Right adnexum displays no mass, no tenderness and no fullness. Left adnexum displays no mass, no tenderness and no fullness. No tenderness in the vagina. No foreign body in the vagina. No vaginal discharge found.   Musculoskeletal: Normal range of motion.   Lymphadenopathy:        Right: No inguinal adenopathy present.        Left: No inguinal adenopathy  present.   Neurological: She is alert and oriented to person, place, and time. She has normal reflexes.   Skin: Skin is dry.   Psychiatric: She has a normal mood and affect. Her behavior is normal. Judgment and thought content normal.   Nursing note and vitals reviewed.        Assessment:        1. Encounter for gynecological examination without abnormal finding    2. Cervical cancer screening    3. Breast cancer screening               Plan:        Ruma was seen today for well woman.    Diagnoses and all orders for this visit:    Encounter for gynecological examination without abnormal finding    Cervical cancer screening  -     Liquid-based pap smear, screening    Breast cancer screening  -     Mammo Digital Screening Bilat with CAD; Standing

## 2018-03-28 ENCOUNTER — HOSPITAL ENCOUNTER (OUTPATIENT)
Dept: RADIOLOGY | Facility: HOSPITAL | Age: 48
Discharge: HOME | End: 2018-03-28
Attending: ORTHOPAEDIC SURGERY
Payer: COMMERCIAL

## 2018-03-28 DIAGNOSIS — M79.672 LEFT FOOT PAIN: ICD-10-CM

## 2018-03-28 PROCEDURE — 73650 X-RAY EXAM OF HEEL: CPT | Mod: TC,LT

## 2018-03-28 PROCEDURE — 73650 X-RAY EXAM OF HEEL: CPT | Mod: 26,59,LT, | Performed by: RADIOLOGY

## 2018-03-28 PROCEDURE — 73630 X-RAY EXAM OF FOOT: CPT | Mod: 26,LT,, | Performed by: RADIOLOGY

## 2018-03-28 PROCEDURE — 73630 X-RAY EXAM OF FOOT: CPT | Mod: TC,LT

## 2018-05-07 ENCOUNTER — OFFICE VISIT (OUTPATIENT)
Dept: NEUROLOGY | Facility: CLINIC | Age: 48
End: 2018-05-07
Payer: COMMERCIAL

## 2018-05-07 ENCOUNTER — LAB VISIT (OUTPATIENT)
Dept: LAB | Facility: HOSPITAL | Age: 48
End: 2018-05-07
Attending: PSYCHIATRY & NEUROLOGY
Payer: COMMERCIAL

## 2018-05-07 VITALS
SYSTOLIC BLOOD PRESSURE: 122 MMHG | HEIGHT: 63 IN | RESPIRATION RATE: 18 BRPM | BODY MASS INDEX: 30.48 KG/M2 | DIASTOLIC BLOOD PRESSURE: 84 MMHG | HEART RATE: 88 BPM | WEIGHT: 172 LBS

## 2018-05-07 DIAGNOSIS — R20.0 NUMBNESS AND TINGLING OF BOTH FEET: ICD-10-CM

## 2018-05-07 DIAGNOSIS — R20.0 NUMBNESS AND TINGLING OF BOTH FEET: Primary | ICD-10-CM

## 2018-05-07 DIAGNOSIS — R20.2 NUMBNESS AND TINGLING OF BOTH FEET: Primary | ICD-10-CM

## 2018-05-07 DIAGNOSIS — R20.2 NUMBNESS AND TINGLING OF BOTH FEET: ICD-10-CM

## 2018-05-07 LAB — VIT B12 SERPL-MCNC: 754 PG/ML

## 2018-05-07 PROCEDURE — 3008F BODY MASS INDEX DOCD: CPT | Mod: CPTII,S$GLB,, | Performed by: PSYCHIATRY & NEUROLOGY

## 2018-05-07 PROCEDURE — 99204 OFFICE O/P NEW MOD 45 MIN: CPT | Mod: S$GLB,,, | Performed by: PSYCHIATRY & NEUROLOGY

## 2018-05-07 PROCEDURE — 36415 COLL VENOUS BLD VENIPUNCTURE: CPT

## 2018-05-07 PROCEDURE — 99999 PR PBB SHADOW E&M-EST. PATIENT-LVL III: CPT | Mod: PBBFAC,,, | Performed by: PSYCHIATRY & NEUROLOGY

## 2018-05-07 PROCEDURE — 82607 VITAMIN B-12: CPT

## 2018-05-07 NOTE — PROGRESS NOTES
HPI: Ruma Dietrich is a 48 y.o. female here complaining of pain in both feet.  She states around this time of year, for the past 3 years, she has felt stiff joints and hips. She also noted pain in the feet at that time.  This has occurred from Jan- March and then subsides.  She has had rheumatological labs but has never consulted with rheumatology.  She states this year she had the same symptoms. She then had numbness in the left foot in the toes which was new for her.   She was given relafan by PCP. She then saw Orthopedist   She was actually  Scheduled for EMG with me this week (recommended per her orthopedist)  Ortho had recommended MRI of the left foot but she cancelled when she started having numbness and pain in the right toes as well.   She states she is poorly tolerating tight shoes.     She previously saw another neurologist, Dr Pham. She was seen for history of myaglia and had EMG of the arms at that time. She states she does have some pain in the hand currently.   Notes state xray C spine was largely unremarkable then  She currently denies neck pain   Her symptoms predate spironolactone use.   She was on PPI but was switched to dexilant and stopped this recently and pain is a bit improved now  She works as a nurse in a dermatology office    Review of Systems   Constitutional: Negative for fever.   HENT: Negative for nosebleeds.    Eyes: Negative for double vision.   Respiratory: Negative for hemoptysis.    Cardiovascular: Negative for leg swelling.   Gastrointestinal: Negative for blood in stool.   Genitourinary: Negative for hematuria.   Musculoskeletal: Positive for back pain and myalgias. Negative for neck pain.        Rare back pain with bending and no radicular symptoms   Skin: Negative for rash.   Neurological: Positive for sensory change. Negative for tremors.   Psychiatric/Behavioral: Negative for memory loss.         I have reviewed all of this patient's past medical and surgical  histories as well as family and social histories and active allergies and medications as documented in the electronic medical record.        Exam:  Gen Appearance, well developed/nourished in no apparent distress  CV: 2+ distal pulses with no edema or swelling  Neuro:  MS: Awake, alert, oriented to place, person, time, situation. Sustains attention. Recent/remote memory intact, Language is full to spontaneous speech/repetition/naming/comprehension. Fund of Knowledge is full  CN: Optic discs are flat with normal vasculature, PERRL, Extraoccular movements and visual fields are full. Normal facial sensation and strength, Hearing symmetric, Tongue and Palate are midline and strong. Shoulder Shrug symmetric and strong.  Motor: Normal bulk, tone, no abnormal movements. 5/5 strength bilateral upper/lower extremities with 2+ reflexes and no clonus  Sensory: symmetric to light touch, pain, temp, and vibration, Romberg negative  Cerebellar: Finger-nose,Heal-shin, Rapid alternating movements intact  Gait: Normal stance, no ataxia    Imaging: 3/2017 MRI brain    Labs: 2018 BMP, TSH, ASHWINI, CRP, RA factor, reviewed.  A1C 5.1  CBC normal    ARIELLA negative and CK normal with lower normal B12 in 2017      Assessment/Plan: Ruma Dietrich is a 48 y.o. female with 3 years of prolonged episodes of foot pain, now with foot numbness bilaterally. She also has some hand pain.   I recommend:   1. EMG/NCS of legs is pending Friday per orders from Dr Latham  2. Obtain report of prior EMG of the arms from Dr Pham who she saw previously for myalgia.   3. Check B12 level today.   4. Note: She was on PPI but was switched to dexilant and stopped this recently and pain is a bit improved now. She switched to tagamet now  5. Consider Rheumatology consult if the above is normal given her diffuse pain and myalgia  RTC EMG/NCS

## 2018-05-11 ENCOUNTER — PROCEDURE VISIT (OUTPATIENT)
Dept: NEUROLOGY | Facility: CLINIC | Age: 48
End: 2018-05-11
Payer: COMMERCIAL

## 2018-05-11 DIAGNOSIS — M79.671 PAIN IN BOTH FEET: Primary | ICD-10-CM

## 2018-05-11 DIAGNOSIS — M79.672 PAIN IN BOTH FEET: Primary | ICD-10-CM

## 2018-05-11 PROCEDURE — 95910 NRV CNDJ TEST 7-8 STUDIES: CPT | Mod: S$GLB,,, | Performed by: PSYCHIATRY & NEUROLOGY

## 2018-05-11 PROCEDURE — 95886 MUSC TEST DONE W/N TEST COMP: CPT | Mod: S$GLB,,, | Performed by: PSYCHIATRY & NEUROLOGY

## 2018-05-11 NOTE — PROCEDURES
Procedures   REPORT OF EMG and NERVE CONDUCTION STUDY    Name: Ruma Dietrich  Date of Study:  5/11/18  Referring Physician: Dr Latham  Test Performed by:  MD Audie  Full Values Attached  Informed Consent Scanned.   No anesthesia used.   Amount of Blood Loss: none. The patient tolerated this procedure well.       Informed consent was obtained prior to performing this study. Two patient identifiers were confirmed with the patient prior to performing this study. A time out to determine correct patient and and agreement on procedure performed was conducted prior to the concentric needle examination.    Reason for the study:  Pain in the feet with numb toes- first on the left then right side      Findings:   Nerve conduction studies of the bilateral peroneal motor, bilateral tibial motor (recording also at the AD[facility]), bilateral sural nerves and bilateral H-reflexes were performed.   Amplitudes, distal latencies, conduction velocities, and F-waves were normal. H reflexes were symetric  EMG of selected muscles of the bilateral legs  and bilateral lumbar and sacral paraspinal muscles were performed as indicated on the attached sheets.  Insertional activity was normal without fasciculation or fibrillation, normal sized and phasia of motor units.      Impression:  Normal EMG/NCS of the legs  No electrophysiologic evidence of Tarsal Tunnel Syndrome on this study    Mario Bronson M.D. [facility].     Recommendations (discussed at this visit):  1. The patient consulted with me this week independently prior to this study. I reviewed this patients prior record from Dr Pham, her prior neurologist  2. Her EMG  of the arms in 3/2017 showed cervical radicular disease correlating with facet joint changes on xray, but  not believed to explain her diffuse myalgia. The records from Dr Pham state the patient has had years of UE and LE myalgia.  Prior unremarkable labs include TSH, B12, ASHWINI, RA factor, Anti-ARIELLA  antibodies, ESR, and CK  MRI brain was negative for demyelination prior.  2. For her long standing myalgia: Consider fibromyalgia diagnosis as discussed today. She can consult rheumatology at any point for further evaluation/treatment recommendations  3. She will schedule a follow up  appointment with  Dr Latham as he previously recommended to further review her foot pain which she states is now bilateral.  The cause of her numb toes is not evident on this study, but she states this is improved.     RTC 6 months, but notify me sooner if symptoms change or are worse.

## 2019-01-02 ENCOUNTER — OFFICE VISIT (OUTPATIENT)
Dept: OTOLARYNGOLOGY | Facility: CLINIC | Age: 49
End: 2019-01-02
Payer: COMMERCIAL

## 2019-01-02 VITALS
BODY MASS INDEX: 31.41 KG/M2 | DIASTOLIC BLOOD PRESSURE: 74 MMHG | HEART RATE: 67 BPM | WEIGHT: 177.25 LBS | TEMPERATURE: 98 F | HEIGHT: 63 IN | SYSTOLIC BLOOD PRESSURE: 110 MMHG

## 2019-01-02 DIAGNOSIS — H93.13 TINNITUS, BILATERAL: ICD-10-CM

## 2019-01-02 DIAGNOSIS — H93.8X3 SENSATION OF FULLNESS IN BOTH EARS: ICD-10-CM

## 2019-01-02 DIAGNOSIS — R42 VERTIGO: ICD-10-CM

## 2019-01-02 DIAGNOSIS — H91.93 BILATERAL HEARING LOSS, UNSPECIFIED HEARING LOSS TYPE: Primary | ICD-10-CM

## 2019-01-02 PROCEDURE — 99203 PR OFFICE/OUTPT VISIT, NEW, LEVL III, 30-44 MIN: ICD-10-PCS | Mod: S$GLB,,, | Performed by: OTOLARYNGOLOGY

## 2019-01-02 PROCEDURE — 99203 OFFICE O/P NEW LOW 30 MIN: CPT | Mod: S$GLB,,, | Performed by: OTOLARYNGOLOGY

## 2019-01-02 PROCEDURE — 3008F PR BODY MASS INDEX (BMI) DOCUMENTED: ICD-10-PCS | Mod: CPTII,S$GLB,, | Performed by: OTOLARYNGOLOGY

## 2019-01-02 PROCEDURE — 99999 PR PBB SHADOW E&M-EST. PATIENT-LVL IV: ICD-10-PCS | Mod: PBBFAC,,, | Performed by: OTOLARYNGOLOGY

## 2019-01-02 PROCEDURE — 99999 PR PBB SHADOW E&M-EST. PATIENT-LVL IV: CPT | Mod: PBBFAC,,, | Performed by: OTOLARYNGOLOGY

## 2019-01-02 PROCEDURE — 3008F BODY MASS INDEX DOCD: CPT | Mod: CPTII,S$GLB,, | Performed by: OTOLARYNGOLOGY

## 2019-01-02 RX ORDER — MECLIZINE HYDROCHLORIDE 25 MG/1
TABLET ORAL
COMMUNITY
Start: 2018-11-08 | End: 2020-06-05

## 2019-01-02 RX ORDER — ZINC GLUCONATE 13.3 MG
LOZENGE ORAL
COMMUNITY
End: 2020-06-05

## 2019-01-02 RX ORDER — SPIRONOLACTONE 50 MG/1
TABLET, FILM COATED ORAL
COMMUNITY
End: 2020-06-05

## 2019-01-02 RX ORDER — MECLIZINE HYDROCHLORIDE 25 MG/1
TABLET ORAL
COMMUNITY
End: 2020-06-05

## 2019-01-02 RX ORDER — ZOLPIDEM TARTRATE 10 MG/1
10 TABLET ORAL NIGHTLY PRN
COMMUNITY

## 2019-01-02 RX ORDER — ONDANSETRON 4 MG/1
TABLET, ORALLY DISINTEGRATING ORAL
COMMUNITY
End: 2020-06-05

## 2019-01-02 RX ORDER — AZITHROMYCIN 250 MG/1
TABLET, FILM COATED ORAL
COMMUNITY
End: 2020-06-05 | Stop reason: ALTCHOICE

## 2019-01-02 RX ORDER — KETOCONAZOLE 20 MG/ML
SHAMPOO, SUSPENSION TOPICAL
COMMUNITY
End: 2020-06-05

## 2019-01-02 RX ORDER — ESOMEPRAZOLE MAGNESIUM 40 MG/1
CAPSULE, DELAYED RELEASE ORAL
COMMUNITY
Start: 2018-12-06 | End: 2020-06-05 | Stop reason: SDUPTHER

## 2019-01-02 RX ORDER — AZITHROMYCIN 250 MG/1
TABLET, FILM COATED ORAL
COMMUNITY
Start: 2018-12-14 | End: 2020-06-05

## 2019-01-02 RX ORDER — ESOMEPRAZOLE MAGNESIUM 40 MG/1
40 CAPSULE, DELAYED RELEASE ORAL NIGHTLY
COMMUNITY

## 2019-01-02 NOTE — PROGRESS NOTES
Subjective:       Patient ID: Ruma Dietrich is a 48 y.o. female.    Chief Complaint: Hearing Loss and Dizziness    Hearing Loss:   Chronicity:  New  Onset:  More than 1 month ago  Progression since onset:  Gradually worsening  Frequency:  Constantly  Severity:  Mild   Associated symptoms: dizziness and tinnitus.  No fever and no headaches.   PMH includes: dizziness.  No ear surgery.  Dizziness:   Chronicity:  New  Onset:  More than 1 month ago  Progression since onset:  Waxing and waning  Frequency:  Every few days  Severity:  Mild  Duration:  1 minute  Dizziness characteristics:  Spinning inside head only   Associated symptoms: hearing loss and tinnitus.no fever, no headaches, no nausea, no palpitations and no chest pain.  Aggravated by:  Nothing  Risk factors: no migraines or headaches.  Treatments tried:  Meclizine  Improvements on treatment:  No reliefno ear surgery.    Review of Systems   Constitutional: Negative for chills, fever and unexpected weight change.   HENT: Positive for hearing loss and tinnitus. Negative for sore throat and trouble swallowing.    Eyes: Negative for pain and visual disturbance.   Respiratory: Negative for apnea and shortness of breath.    Cardiovascular: Negative for chest pain and palpitations.   Gastrointestinal: Negative for abdominal pain and nausea.   Endocrine: Negative for cold intolerance and heat intolerance.   Musculoskeletal: Negative for joint swelling and neck stiffness.   Skin: Negative for color change and rash.   Neurological: Positive for dizziness. Negative for facial asymmetry and headaches.   Hematological: Negative for adenopathy. Does not bruise/bleed easily.   Psychiatric/Behavioral: Negative for agitation. The patient is not nervous/anxious.        Objective:      Physical Exam   Constitutional: She is oriented to person, place, and time. She appears well-developed and well-nourished. No distress.   HENT:   Head: Normocephalic and atraumatic.   Right Ear:  Tympanic membrane, external ear and ear canal normal.   Left Ear: Tympanic membrane, external ear and ear canal normal.   Nose: Nose normal.   Mouth/Throat: Uvula is midline, oropharynx is clear and moist and mucous membranes are normal.   Becerra: Midline  Rinne: AC > BC @ 512Hz   Eyes: Conjunctivae and EOM are normal. Pupils are equal, round, and reactive to light.   Neck: Normal range of motion. No tracheal deviation present. No thyromegaly present.   Cardiovascular: Normal rate and regular rhythm.   Pulmonary/Chest: Effort normal. No respiratory distress.   Musculoskeletal: Normal range of motion.   Lymphadenopathy:        Head (right side): No submental, no submandibular and no tonsillar adenopathy present.        Head (left side): No submental, no submandibular and no tonsillar adenopathy present.     She has no cervical adenopathy.   Neurological: She is alert and oriented to person, place, and time.   Psychiatric: She has a normal mood and affect. Her behavior is normal.   Nursing note and vitals reviewed.      Data:  Reviewed outside audiogram, pure tone thresholds within normal limits.      Assessment:       1. Bilateral hearing loss, unspecified hearing loss type    2. Tinnitus, bilateral    3. Sensation of fullness in both ears    4. Vertigo        Patient with vertigo lasting seconds to minutes non positional, no fluctuating hearing loss, audio normal, tinnitus is bilateral.  Not consistent with meniere's which patient was concerned she may have. Discussed undergoing VNG and MRI but patient would like to hold off for now.    Plan:    reassured patient regarding normal hearing test   recommend VNG and MRI if pt becomes bothered by dizziness in the future  Follow up as needed.

## 2019-02-05 ENCOUNTER — LAB VISIT (OUTPATIENT)
Dept: LAB | Facility: HOSPITAL | Age: 49
End: 2019-02-05
Attending: PHYSICIAN ASSISTANT
Payer: COMMERCIAL

## 2019-02-05 DIAGNOSIS — R53.81 MALAISE: Primary | ICD-10-CM

## 2019-02-05 LAB
ALBUMIN SERPL BCP-MCNC: 3.8 G/DL
ALP SERPL-CCNC: 42 U/L
ALT SERPL W/O P-5'-P-CCNC: 18 U/L
ANION GAP SERPL CALC-SCNC: 7 MMOL/L
AST SERPL-CCNC: 12 U/L
BASOPHILS # BLD AUTO: 0.02 K/UL
BASOPHILS NFR BLD: 0.4 %
BILIRUB SERPL-MCNC: 0.4 MG/DL
BILIRUB UR QL STRIP: NEGATIVE
BUN SERPL-MCNC: 12 MG/DL
CALCIUM SERPL-MCNC: 9.4 MG/DL
CHLORIDE SERPL-SCNC: 108 MMOL/L
CHOLEST SERPL-MCNC: 151 MG/DL
CHOLEST/HDLC SERPL: 3.4 {RATIO}
CLARITY UR: CLEAR
CO2 SERPL-SCNC: 25 MMOL/L
COLOR UR: YELLOW
CREAT SERPL-MCNC: 0.8 MG/DL
DIFFERENTIAL METHOD: ABNORMAL
EOSINOPHIL # BLD AUTO: 0.1 K/UL
EOSINOPHIL NFR BLD: 2.6 %
ERYTHROCYTE [DISTWIDTH] IN BLOOD BY AUTOMATED COUNT: 12.3 %
EST. GFR  (AFRICAN AMERICAN): >60 ML/MIN/1.73 M^2
EST. GFR  (NON AFRICAN AMERICAN): >60 ML/MIN/1.73 M^2
GLUCOSE SERPL-MCNC: 107 MG/DL
GLUCOSE UR QL STRIP: NEGATIVE
HCT VFR BLD AUTO: 41.1 %
HDLC SERPL-MCNC: 45 MG/DL
HDLC SERPL: 29.8 %
HGB BLD-MCNC: 13.6 G/DL
HGB UR QL STRIP: NEGATIVE
KETONES UR QL STRIP: NEGATIVE
LDLC SERPL CALC-MCNC: 91.4 MG/DL
LEUKOCYTE ESTERASE UR QL STRIP: NEGATIVE
LYMPHOCYTES # BLD AUTO: 1.6 K/UL
LYMPHOCYTES NFR BLD: 35.6 %
MCH RBC QN AUTO: 31.1 PG
MCHC RBC AUTO-ENTMCNC: 33.1 G/DL
MCV RBC AUTO: 94 FL
MONOCYTES # BLD AUTO: 0.4 K/UL
MONOCYTES NFR BLD: 7.7 %
NEUTROPHILS # BLD AUTO: 2.4 K/UL
NEUTROPHILS NFR BLD: 53.7 %
NITRITE UR QL STRIP: NEGATIVE
NONHDLC SERPL-MCNC: 106 MG/DL
PH UR STRIP: 6 [PH] (ref 5–8)
PLATELET # BLD AUTO: 176 K/UL
PMV BLD AUTO: 11.3 FL
POTASSIUM SERPL-SCNC: 4.2 MMOL/L
PROT SERPL-MCNC: 6.5 G/DL
PROT UR QL STRIP: NEGATIVE
RBC # BLD AUTO: 4.37 M/UL
SODIUM SERPL-SCNC: 140 MMOL/L
SP GR UR STRIP: 1.02 (ref 1–1.03)
TRIGL SERPL-MCNC: 73 MG/DL
TSH SERPL DL<=0.005 MIU/L-ACNC: 1.48 UIU/ML
URN SPEC COLLECT METH UR: NORMAL
UROBILINOGEN UR STRIP-ACNC: NEGATIVE EU/DL
WBC # BLD AUTO: 4.55 K/UL

## 2019-02-05 PROCEDURE — 81003 URINALYSIS AUTO W/O SCOPE: CPT

## 2019-02-05 PROCEDURE — 87086 URINE CULTURE/COLONY COUNT: CPT

## 2019-02-05 PROCEDURE — 84443 ASSAY THYROID STIM HORMONE: CPT

## 2019-02-05 PROCEDURE — 36415 COLL VENOUS BLD VENIPUNCTURE: CPT

## 2019-02-05 PROCEDURE — 80061 LIPID PANEL: CPT

## 2019-02-05 PROCEDURE — 85025 COMPLETE CBC W/AUTO DIFF WBC: CPT

## 2019-02-05 PROCEDURE — 80053 COMPREHEN METABOLIC PANEL: CPT

## 2019-02-06 LAB
BACTERIA UR CULT: NORMAL
BACTERIA UR CULT: NORMAL

## 2019-07-24 ENCOUNTER — HOSPITAL ENCOUNTER (OUTPATIENT)
Dept: RADIOLOGY | Facility: HOSPITAL | Age: 49
Discharge: HOME | End: 2019-07-24
Attending: PHYSICIAN ASSISTANT
Payer: COMMERCIAL

## 2019-07-24 DIAGNOSIS — M54.50 LOW BACK PAIN: ICD-10-CM

## 2019-07-24 PROCEDURE — 72100 X-RAY EXAM L-S SPINE 2/3 VWS: CPT | Mod: 26,,, | Performed by: RADIOLOGY

## 2019-07-24 PROCEDURE — 72100 XR LUMBAR SPINE 2 OR 3 VIEWS: ICD-10-PCS | Mod: 26,,, | Performed by: RADIOLOGY

## 2019-07-24 PROCEDURE — 72100 X-RAY EXAM L-S SPINE 2/3 VWS: CPT | Mod: TC

## 2019-12-06 ENCOUNTER — OFFICE VISIT (OUTPATIENT)
Dept: OBSTETRICS AND GYNECOLOGY | Facility: CLINIC | Age: 49
End: 2019-12-06
Payer: COMMERCIAL

## 2019-12-06 VITALS
HEART RATE: 79 BPM | BODY MASS INDEX: 32.24 KG/M2 | SYSTOLIC BLOOD PRESSURE: 102 MMHG | DIASTOLIC BLOOD PRESSURE: 67 MMHG | WEIGHT: 182 LBS

## 2019-12-06 DIAGNOSIS — Z12.39 BREAST CANCER SCREENING: ICD-10-CM

## 2019-12-06 DIAGNOSIS — N39.3 STRESS INCONTINENCE OF URINE: ICD-10-CM

## 2019-12-06 DIAGNOSIS — N92.1 BREAKTHROUGH BLEEDING: Primary | ICD-10-CM

## 2019-12-06 PROCEDURE — 99999 PR PBB SHADOW E&M-EST. PATIENT-LVL III: CPT | Mod: PBBFAC,,, | Performed by: OBSTETRICS & GYNECOLOGY

## 2019-12-06 PROCEDURE — 99999 PR PBB SHADOW E&M-EST. PATIENT-LVL III: ICD-10-PCS | Mod: PBBFAC,,, | Performed by: OBSTETRICS & GYNECOLOGY

## 2019-12-06 PROCEDURE — 3008F PR BODY MASS INDEX (BMI) DOCUMENTED: ICD-10-PCS | Mod: CPTII,S$GLB,, | Performed by: OBSTETRICS & GYNECOLOGY

## 2019-12-06 PROCEDURE — 99213 OFFICE O/P EST LOW 20 MIN: CPT | Mod: S$GLB,,, | Performed by: OBSTETRICS & GYNECOLOGY

## 2019-12-06 PROCEDURE — 3008F BODY MASS INDEX DOCD: CPT | Mod: CPTII,S$GLB,, | Performed by: OBSTETRICS & GYNECOLOGY

## 2019-12-06 PROCEDURE — 99213 PR OFFICE/OUTPT VISIT, EST, LEVL III, 20-29 MIN: ICD-10-PCS | Mod: S$GLB,,, | Performed by: OBSTETRICS & GYNECOLOGY

## 2019-12-06 NOTE — PROGRESS NOTES
Subjective:       Patient ID: Ruma Dietrich is a 49 y.o. female.    Chief Complaint:  Vaginal Bleeding (had Ablation 2017)      History of Present Illness  Patient presents complaining of some breakthrough bleeding. Patient had an ablation 2 years ago.  Patient states that several months ago she started noticing just the slightest bit orange tent with wiping.  Patient states 3 days ago she started noticing some breakthrough spotting.  Patient states it was mild.  She does not have any cramping.  This is the only bleeding she has had since her procedure. She is otherwise without gyn complaints.  Counseling was done and will refer patient for physical therapy    Menstrual History:  OB History        4    Para   2    Term   2            AB   1    Living   2       SAB   1    TAB        Ectopic        Multiple        Live Births   2                Menarche age:  Patient's last menstrual period was 2019 (approximate).         Review of Systems  Review of Systems   Constitutional: Negative for activity change, appetite change, chills, diaphoresis, fatigue, fever and unexpected weight change.   HENT: Positive for hearing loss and tinnitus. Negative for congestion, dental problem, drooling, ear discharge, ear pain, facial swelling, mouth sores, nosebleeds, postnasal drip, rhinorrhea, sinus pressure, sneezing, sore throat, trouble swallowing and voice change.    Eyes: Negative for photophobia, pain, discharge, redness, itching and visual disturbance.   Respiratory: Negative for apnea, cough, choking, chest tightness, shortness of breath, wheezing and stridor.    Cardiovascular: Negative for chest pain, palpitations and leg swelling.   Gastrointestinal: Negative for abdominal distention, abdominal pain, anal bleeding, blood in stool, constipation, diarrhea, nausea, rectal pain and vomiting.   Endocrine: Negative for cold intolerance, heat intolerance, polydipsia, polyphagia and polyuria.   Genitourinary:  Positive for vaginal bleeding. Negative for decreased urine volume, difficulty urinating, dyspareunia, dysuria, enuresis, flank pain, frequency, genital sores, hematuria, menstrual problem, pelvic pain, urgency, vaginal discharge and vaginal pain.   Musculoskeletal: Positive for joint swelling. Negative for arthralgias, back pain, gait problem, myalgias, neck pain and neck stiffness.   Skin: Negative for color change, pallor, rash and wound.   Allergic/Immunologic: Positive for environmental allergies. Negative for food allergies and immunocompromised state.   Neurological: Negative for dizziness, tremors, seizures, syncope, facial asymmetry, speech difficulty, weakness, light-headedness, numbness and headaches.   Hematological: Negative for adenopathy. Does not bruise/bleed easily.   Psychiatric/Behavioral: Negative for agitation, behavioral problems, confusion, decreased concentration, dysphoric mood, hallucinations, self-injury, sleep disturbance and suicidal ideas. The patient is not nervous/anxious and is not hyperactive.            Objective:      Physical Exam   Genitourinary: Rectal exam shows no external hemorrhoid. No labial fusion. There is no rash, tenderness, lesion or injury on the right labia. There is no rash, tenderness, lesion or injury on the left labia. Uterus is not deviated, not enlarged, not fixed and not tender. Cervix exhibits no motion tenderness, no discharge and no friability. Right adnexum displays no mass, no tenderness and no fullness. Left adnexum displays no mass, no tenderness and no fullness. No erythema, tenderness or bleeding in the vagina. No foreign body in the vagina. No signs of injury around the vagina. No vaginal discharge found.   Nursing note and vitals reviewed.          Assessment:        1. Breakthrough bleeding                Plan:    Ruma was seen today for vaginal bleeding.    Diagnoses and all orders for this visit:    Breakthrough bleeding

## 2019-12-11 ENCOUNTER — TELEPHONE (OUTPATIENT)
Dept: OBSTETRICS AND GYNECOLOGY | Facility: CLINIC | Age: 49
End: 2019-12-11

## 2019-12-11 NOTE — TELEPHONE ENCOUNTER
Therapy prescription, office notes, insurance card, and demographics faxed to [facility]. Confirmation received, scanned to chart.

## 2020-01-10 ENCOUNTER — TELEPHONE (OUTPATIENT)
Dept: OBSTETRICS AND GYNECOLOGY | Facility: CLINIC | Age: 50
End: 2020-01-10

## 2020-01-10 NOTE — TELEPHONE ENCOUNTER
Received a note stating patient refused to schedule for the therapy group placed on Dr. Thomas ng.

## 2020-01-28 ENCOUNTER — TELEPHONE (OUTPATIENT)
Dept: OBSTETRICS AND GYNECOLOGY | Facility: CLINIC | Age: 50
End: 2020-01-28

## 2020-05-04 ENCOUNTER — OFFICE VISIT (OUTPATIENT)
Dept: OBSTETRICS AND GYNECOLOGY | Facility: CLINIC | Age: 50
End: 2020-05-04
Payer: COMMERCIAL

## 2020-05-04 VITALS
HEART RATE: 70 BPM | WEIGHT: 186 LBS | BODY MASS INDEX: 32.95 KG/M2 | SYSTOLIC BLOOD PRESSURE: 122 MMHG | RESPIRATION RATE: 12 BRPM | DIASTOLIC BLOOD PRESSURE: 67 MMHG

## 2020-05-04 DIAGNOSIS — R39.15 URGENCY OF URINATION: Primary | ICD-10-CM

## 2020-05-04 DIAGNOSIS — N89.8 VAGINAL IRRITATION: ICD-10-CM

## 2020-05-04 DIAGNOSIS — B37.31 VAGINAL YEAST INFECTION: ICD-10-CM

## 2020-05-04 LAB
BACTERIA SPEC CULT: NORMAL
BILIRUB SERPL-MCNC: NEGATIVE MG/DL
BLOOD URINE, POC: NEGATIVE
CASTS: NEGATIVE
COLOR, POC UA: NORMAL
CRYSTALS: NEGATIVE
GARDNERELLA VAGINALIS: NEGATIVE
GLUCOSE UR QL STRIP: NORMAL
KETONES UR QL STRIP: NEGATIVE
LEUKOCYTE ESTERASE URINE, POC: NEGATIVE
NITRITE, POC UA: NEGATIVE
OTHER MICROSC. OBSERVATIONS: ABNORMAL
PH, POC UA: 5
POC BACTERIAL VAGINOSIS: ABNORMAL
POC CLUE CELLS: ABNORMAL
PROTEIN, POC: NEGATIVE
RBC CELLS COUNTED: NEGATIVE
SPECIFIC GRAVITY, POC UA: 1.02
TRICHOMONAS, POC: NEGATIVE
UROBILINOGEN, POC UA: NORMAL
WHITE BLOOD CELLS: NEGATIVE
YEAST WET PREP: POSITIVE

## 2020-05-04 PROCEDURE — 99999 PR PBB SHADOW E&M-EST. PATIENT-LVL III: ICD-10-PCS | Mod: PBBFAC,,, | Performed by: OBSTETRICS & GYNECOLOGY

## 2020-05-04 PROCEDURE — 3008F PR BODY MASS INDEX (BMI) DOCUMENTED: ICD-10-PCS | Mod: CPTII,S$GLB,, | Performed by: OBSTETRICS & GYNECOLOGY

## 2020-05-04 PROCEDURE — 81002 URINALYSIS NONAUTO W/O SCOPE: CPT | Mod: S$GLB,,, | Performed by: OBSTETRICS & GYNECOLOGY

## 2020-05-04 PROCEDURE — 99999 PR PBB SHADOW E&M-EST. PATIENT-LVL III: CPT | Mod: PBBFAC,,, | Performed by: OBSTETRICS & GYNECOLOGY

## 2020-05-04 PROCEDURE — 81000 POCT URINE SEDIMENT EXAM: ICD-10-PCS | Mod: S$GLB,,, | Performed by: OBSTETRICS & GYNECOLOGY

## 2020-05-04 PROCEDURE — 81002 POCT URINE DIPSTICK WITHOUT MICROSCOPE: ICD-10-PCS | Mod: S$GLB,,, | Performed by: OBSTETRICS & GYNECOLOGY

## 2020-05-04 PROCEDURE — 81000 URINALYSIS NONAUTO W/SCOPE: CPT | Mod: S$GLB,,, | Performed by: OBSTETRICS & GYNECOLOGY

## 2020-05-04 PROCEDURE — 87210 POCT WET PREP: ICD-10-PCS | Mod: QW,S$GLB,, | Performed by: OBSTETRICS & GYNECOLOGY

## 2020-05-04 PROCEDURE — 99213 OFFICE O/P EST LOW 20 MIN: CPT | Mod: 25,S$GLB,, | Performed by: OBSTETRICS & GYNECOLOGY

## 2020-05-04 PROCEDURE — 87210 SMEAR WET MOUNT SALINE/INK: CPT | Mod: QW,S$GLB,, | Performed by: OBSTETRICS & GYNECOLOGY

## 2020-05-04 PROCEDURE — 3008F BODY MASS INDEX DOCD: CPT | Mod: CPTII,S$GLB,, | Performed by: OBSTETRICS & GYNECOLOGY

## 2020-05-04 PROCEDURE — 99213 PR OFFICE/OUTPT VISIT, EST, LEVL III, 20-29 MIN: ICD-10-PCS | Mod: 25,S$GLB,, | Performed by: OBSTETRICS & GYNECOLOGY

## 2020-05-04 RX ORDER — TERCONAZOLE 8 MG/G
1 CREAM VAGINAL DAILY
Qty: 20 G | Refills: 1 | Status: SHIPPED | OUTPATIENT
Start: 2020-05-04 | End: 2020-06-05

## 2020-05-04 NOTE — PROGRESS NOTES
Subjective:       Patient ID: Ruma Dietrich is a 50 y.o. female.    Chief Complaint:  Pelvic Pain (started 2 days ago) and Urinary Urgency (got to go feeling)      History of Present Illness  Patient presents with several days possible bladder spasms with some urgency.  Patient does admit to increased water hydration in an attempt to lose weight.  Patient states she does not have burning or urgency.  She also admits to some external itching.  Patient with questions about STD but does not want to have testing unless I feel it is specifically necessary.  Counseling on STDs done.    Menstrual History:  OB History        4    Para   2    Term   2            AB   1    Living   2       SAB   1    TAB        Ectopic        Multiple        Live Births   2                Menarche age:  Patient's last menstrual period was 2020.         Review of Systems  Review of Systems   Constitutional: Negative for activity change, appetite change, chills, diaphoresis, fatigue, fever and unexpected weight change.   HENT: Negative for congestion, dental problem, drooling, ear discharge, ear pain, facial swelling, hearing loss, mouth sores, nosebleeds, postnasal drip, rhinorrhea, sinus pressure, sneezing, sore throat, tinnitus, trouble swallowing and voice change.    Eyes: Negative for photophobia, pain, discharge, redness, itching and visual disturbance.   Respiratory: Negative for apnea, cough, choking, chest tightness, shortness of breath, wheezing and stridor.    Cardiovascular: Negative for chest pain, palpitations and leg swelling.   Gastrointestinal: Negative for abdominal distention, abdominal pain, anal bleeding, blood in stool, constipation, diarrhea, nausea, rectal pain and vomiting.   Endocrine: Negative for cold intolerance, heat intolerance, polydipsia, polyphagia and polyuria.   Genitourinary: Positive for pelvic pain and urgency. Negative for decreased urine volume, difficulty urinating, dyspareunia,  dysuria, enuresis, flank pain, frequency, genital sores, hematuria, menstrual problem, vaginal bleeding, vaginal discharge and vaginal pain.   Musculoskeletal: Negative for arthralgias, back pain, gait problem, joint swelling, myalgias, neck pain and neck stiffness.   Skin: Negative for color change, pallor, rash and wound.   Allergic/Immunologic: Negative for environmental allergies, food allergies and immunocompromised state.   Neurological: Negative for dizziness, tremors, seizures, syncope, facial asymmetry, speech difficulty, weakness, light-headedness, numbness and headaches.   Hematological: Negative for adenopathy. Does not bruise/bleed easily.   Psychiatric/Behavioral: Negative for agitation, behavioral problems, confusion, decreased concentration, dysphoric mood, hallucinations, self-injury, sleep disturbance and suicidal ideas. The patient is not nervous/anxious and is not hyperactive.            Objective:      Physical Exam   Genitourinary: Rectal exam shows no external hemorrhoid. No labial fusion. There is no rash, tenderness, lesion or injury on the right labia. There is no rash, tenderness, lesion or injury on the left labia. Uterus is not deviated, not enlarged, not fixed and not tender. Cervix exhibits no motion tenderness, no discharge and no friability. Right adnexum displays no mass, no tenderness and no fullness. Left adnexum displays no mass, no tenderness and no fullness. No erythema, tenderness or bleeding in the vagina. No foreign body in the vagina. No signs of injury around the vagina. No vaginal discharge found.   Nursing note and vitals reviewed.          Assessment:     Bladder pain  Frequency  Vaginal irritation   Vaginal yeast        Plan:      Wet prep  Urinalysis   Ruma was seen today for pelvic pain and urinary urgency.    Diagnoses and all orders for this visit:    Urgency of urination  -     Cancel: POCT urinalysis, dipstick or tablet reag  -     POCT URINE SEDIMENT EXAM  -      POCT URINE DIPSTICK WITHOUT MICROSCOPE    Vaginal irritation  -     POCT WET PREP    Vaginal yeast infection    Other orders  -     terconazole (TERAZOL 3) 0.8 % vaginal cream; Place 1 applicator vaginally once daily.

## 2020-05-12 ENCOUNTER — TELEPHONE (OUTPATIENT)
Dept: OBSTETRICS AND GYNECOLOGY | Facility: CLINIC | Age: 50
End: 2020-05-12

## 2020-05-12 RX ORDER — FLUCONAZOLE 100 MG/1
100 TABLET ORAL EVERY OTHER DAY
Qty: 30 TABLET | Refills: 0 | Status: SHIPPED | OUTPATIENT
Start: 2020-05-12 | End: 2021-11-29

## 2020-05-12 NOTE — TELEPHONE ENCOUNTER
----- Message from Isabel Thompson sent at 5/12/2020 12:27 PM CDT -----  Contact: self  Ruma Dietrich  MRN: [id number]  Home Phone      [phone]  Work Phone      [phone]  Mobile          [phone]    Patient Care Team:  Archie Newman MD as PCP - General (Family Medicine)  OB? No  What phone number can you be reached at? [phone]  Message: Pt was seen last week for yeast infection. Pt states she is still having itching and having discharge. Requesting different RX.  Pharmacy: [facility] in [place]

## 2020-05-12 NOTE — TELEPHONE ENCOUNTER
Patient states she was seen last week for a yeast inf and given terazol cream and she is still itching and wants something else called in please advise

## 2020-05-19 ENCOUNTER — TELEPHONE (OUTPATIENT)
Dept: OBSTETRICS AND GYNECOLOGY | Facility: CLINIC | Age: 50
End: 2020-05-19

## 2020-05-19 NOTE — TELEPHONE ENCOUNTER
----- Message from Leonardo Sinclair LPN sent at 5/18/2020  5:13 PM CDT -----  Contact: self      ----- Message -----  From: Justine Garcia LPN  Sent: 5/18/2020   4:59 PM CDT  To: [facility] Clinical Staff        ----- Message -----  From: Isabel Thompson  Sent: 5/18/2020   4:36 PM CDT  To: [facility] Staff    Ruma Vickanatoliy  MRN: [id number]  Home Phone      [phone]  Work Phone      [phone]  Mobile          [phone]    Patient Care Team:  Archie Newman MD as PCP - General (Family Medicine)  OB? No  What phone number can you be reached at? [phone]  Message: Pt would like to speak with a nurse. Pt states she is still having itching and pelvic pains. Requesting ultrasound.

## 2020-05-19 NOTE — TELEPHONE ENCOUNTER
Patient concerned she still has the yeast infection, and having pelvic pain please advise she states b/s is 140 fasting

## 2020-05-20 NOTE — TELEPHONE ENCOUNTER
Patient states she is feeling like it is getting better and will call if she needs to come in for a reevaluation.

## 2020-06-05 ENCOUNTER — OFFICE VISIT (OUTPATIENT)
Dept: OBSTETRICS AND GYNECOLOGY | Facility: CLINIC | Age: 50
End: 2020-06-05
Payer: COMMERCIAL

## 2020-06-05 VITALS
DIASTOLIC BLOOD PRESSURE: 67 MMHG | HEART RATE: 72 BPM | WEIGHT: 186 LBS | BODY MASS INDEX: 32.95 KG/M2 | SYSTOLIC BLOOD PRESSURE: 122 MMHG

## 2020-06-05 DIAGNOSIS — R30.0 DYSURIA: Primary | ICD-10-CM

## 2020-06-05 DIAGNOSIS — N89.8 VAGINAL IRRITATION: ICD-10-CM

## 2020-06-05 DIAGNOSIS — N30.00 ACUTE CYSTITIS WITHOUT HEMATURIA: ICD-10-CM

## 2020-06-05 LAB
BILIRUB SERPL-MCNC: ABNORMAL MG/DL
BLOOD URINE, POC: ABNORMAL
CLARITY, POC UA: CLEAR
COLOR, POC UA: YELLOW
GLUCOSE UR QL STRIP: ABNORMAL
KETONES UR QL STRIP: ABNORMAL
LEUKOCYTE ESTERASE URINE, POC: ABNORMAL
NITRITE, POC UA: ABNORMAL
PH, POC UA: 5
PROTEIN, POC: ABNORMAL
SPECIFIC GRAVITY, POC UA: 1.03
UROBILINOGEN, POC UA: ABNORMAL

## 2020-06-05 PROCEDURE — 99999 PR PBB SHADOW E&M-EST. PATIENT-LVL III: ICD-10-PCS | Mod: PBBFAC,,, | Performed by: OBSTETRICS & GYNECOLOGY

## 2020-06-05 PROCEDURE — 99213 OFFICE O/P EST LOW 20 MIN: CPT | Mod: 25,S$GLB,, | Performed by: OBSTETRICS & GYNECOLOGY

## 2020-06-05 PROCEDURE — 99213 PR OFFICE/OUTPT VISIT, EST, LEVL III, 20-29 MIN: ICD-10-PCS | Mod: 25,S$GLB,, | Performed by: OBSTETRICS & GYNECOLOGY

## 2020-06-05 PROCEDURE — 87481 CANDIDA DNA AMP PROBE: CPT | Mod: 59

## 2020-06-05 PROCEDURE — 99999 PR PBB SHADOW E&M-EST. PATIENT-LVL III: CPT | Mod: PBBFAC,,, | Performed by: OBSTETRICS & GYNECOLOGY

## 2020-06-05 PROCEDURE — 81002 URINALYSIS NONAUTO W/O SCOPE: CPT | Mod: S$GLB,,, | Performed by: OBSTETRICS & GYNECOLOGY

## 2020-06-05 PROCEDURE — 81002 POCT URINE DIPSTICK WITHOUT MICROSCOPE: ICD-10-PCS | Mod: S$GLB,,, | Performed by: OBSTETRICS & GYNECOLOGY

## 2020-06-05 RX ORDER — NITROFURANTOIN 25; 75 MG/1; MG/1
100 CAPSULE ORAL 2 TIMES DAILY
Qty: 14 CAPSULE | Refills: 0 | Status: SHIPPED | OUTPATIENT
Start: 2020-06-05 | End: 2021-11-18

## 2020-06-05 NOTE — PROGRESS NOTES
Subjective:       Patient ID: Ruma Dietrich is a 50 y.o. female.    Chief Complaint:  Urinary Frequency (some burning with urination); Urinary Pressure; Vaginal Discharge (brownish, she is at the end of her cycle. ); and Vaginal Itching      History of Present Illness  Patient presents after being treated for a recent yeast infection stating that she still has low abdominal discomfort along with bladder pressure urgency and small amounts of urine.  Patient states the symptoms are going on for a couple of days.  She still has some slight internal vaginal burning but states that the redness has gone away vaginally almost completely.    Menstrual History:  OB History        4    Para   2    Term   2            AB   1    Living   2       SAB   1    TAB        Ectopic        Multiple        Live Births   2                Menarche age:  Patient's last menstrual period was 2020 (exact date).         Review of Systems  Review of Systems   Constitutional: Negative for activity change, appetite change, chills, diaphoresis, fatigue, fever and unexpected weight change.   HENT: Negative for congestion, dental problem, drooling, ear discharge, ear pain, facial swelling, hearing loss, mouth sores, nosebleeds, postnasal drip, rhinorrhea, sinus pressure, sneezing, sore throat, tinnitus, trouble swallowing and voice change.    Eyes: Negative for photophobia, pain, discharge, redness, itching and visual disturbance.   Respiratory: Negative for apnea, cough, choking, chest tightness, shortness of breath, wheezing and stridor.    Cardiovascular: Negative for chest pain, palpitations and leg swelling.   Gastrointestinal: Negative for abdominal distention, abdominal pain, anal bleeding, blood in stool, constipation, diarrhea, nausea, rectal pain and vomiting.   Endocrine: Negative for cold intolerance, heat intolerance, polydipsia, polyphagia and polyuria.   Genitourinary: Positive for pelvic pain and urgency.  Negative for decreased urine volume, difficulty urinating, dyspareunia, dysuria, enuresis, flank pain, frequency, genital sores, hematuria, menstrual problem, vaginal bleeding, vaginal discharge and vaginal pain.   Musculoskeletal: Positive for neck pain and neck stiffness. Negative for arthralgias, back pain, gait problem, joint swelling and myalgias.   Skin: Negative for color change, pallor, rash and wound.   Allergic/Immunologic: Negative for environmental allergies, food allergies and immunocompromised state.   Neurological: Negative for dizziness, tremors, seizures, syncope, facial asymmetry, speech difficulty, weakness, light-headedness, numbness and headaches.   Hematological: Negative for adenopathy. Does not bruise/bleed easily.   Psychiatric/Behavioral: Negative for agitation, behavioral problems, confusion, decreased concentration, dysphoric mood, hallucinations, self-injury, sleep disturbance and suicidal ideas. The patient is not nervous/anxious and is not hyperactive.            Objective:      Physical Exam   Genitourinary: There is no rash, tenderness, lesion or injury on the right labia. There is no rash, tenderness, lesion or injury on the left labia.   Nursing note and vitals reviewed.          Assessment:        1. Dysuria    2. Vaginal irritation    3. Acute cystitis without hematuria                Plan:         Ruma was seen today for urinary frequency, urinary pressure, vaginal discharge and vaginal itching.    Diagnoses and all orders for this visit:    Dysuria  -     POCT urine dipstick without microscope    Vaginal irritation  -     Vaginosis Screen by DNA Probe    Acute cystitis without hematuria    Other orders  -     nitrofurantoin, macrocrystal-monohydrate, (MACROBID) 100 MG capsule; Take 1 capsule (100 mg total) by mouth 2 (two) times daily.

## 2020-06-08 LAB
BACTERIAL VAGINOSIS DNA: NEGATIVE
CANDIDA GLABRATA DNA: NEGATIVE
CANDIDA KRUSEI DNA: NEGATIVE
CANDIDA RRNA VAG QL PROBE: NEGATIVE
T VAGINALIS RRNA GENITAL QL PROBE: NEGATIVE

## 2020-06-09 ENCOUNTER — OFFICE VISIT (OUTPATIENT)
Dept: OBSTETRICS AND GYNECOLOGY | Facility: CLINIC | Age: 50
End: 2020-06-09
Payer: COMMERCIAL

## 2020-06-09 VITALS
BODY MASS INDEX: 33.49 KG/M2 | HEIGHT: 63 IN | RESPIRATION RATE: 12 BRPM | HEART RATE: 82 BPM | DIASTOLIC BLOOD PRESSURE: 80 MMHG | SYSTOLIC BLOOD PRESSURE: 140 MMHG | WEIGHT: 189 LBS

## 2020-06-09 DIAGNOSIS — Z01.419 ENCOUNTER FOR GYNECOLOGICAL EXAMINATION WITHOUT ABNORMAL FINDING: Primary | ICD-10-CM

## 2020-06-09 DIAGNOSIS — Z12.39 BREAST CANCER SCREENING: ICD-10-CM

## 2020-06-09 DIAGNOSIS — Z12.4 CERVICAL CANCER SCREENING: ICD-10-CM

## 2020-06-09 PROCEDURE — 99999 PR PBB SHADOW E&M-EST. PATIENT-LVL III: CPT | Mod: PBBFAC,,, | Performed by: OBSTETRICS & GYNECOLOGY

## 2020-06-09 PROCEDURE — 99396 PREV VISIT EST AGE 40-64: CPT | Mod: S$GLB,,, | Performed by: OBSTETRICS & GYNECOLOGY

## 2020-06-09 PROCEDURE — 88175 CYTOPATH C/V AUTO FLUID REDO: CPT

## 2020-06-09 PROCEDURE — 99999 PR PBB SHADOW E&M-EST. PATIENT-LVL III: ICD-10-PCS | Mod: PBBFAC,,, | Performed by: OBSTETRICS & GYNECOLOGY

## 2020-06-09 PROCEDURE — 99396 PR PREVENTIVE VISIT,EST,40-64: ICD-10-PCS | Mod: S$GLB,,, | Performed by: OBSTETRICS & GYNECOLOGY

## 2020-06-09 NOTE — PROGRESS NOTES
Subjective:       Patient ID: Ruma Dietrich is a 50 y.o. female.    Chief Complaint:  Well Woman      History of Present Illness  Patient presents for annual exam.  Patient had a recent bladder infection which has almost completely resolved.  She is due for a mammogram.  She is otherwise without gyn complaints.    Menstrual History:  OB History        4    Para   2    Term   2            AB   1    Living   2       SAB   1    TAB        Ectopic        Multiple        Live Births   2                Menarche age:  Patient's last menstrual period was 2020 (exact date).         Review of Systems  Review of Systems   Constitutional: Negative for activity change, appetite change, chills, diaphoresis, fatigue, fever and unexpected weight change.   HENT: Negative for congestion, dental problem, drooling, ear discharge, ear pain, facial swelling, hearing loss, mouth sores, nosebleeds, postnasal drip, rhinorrhea, sinus pressure, sneezing, sore throat, tinnitus, trouble swallowing and voice change.    Eyes: Negative for photophobia, pain, discharge, redness, itching and visual disturbance.   Respiratory: Negative for apnea, cough, choking, chest tightness, shortness of breath, wheezing and stridor.    Cardiovascular: Negative for chest pain, palpitations and leg swelling.   Gastrointestinal: Negative for abdominal distention, abdominal pain, anal bleeding, blood in stool, constipation, diarrhea, nausea, rectal pain and vomiting.   Endocrine: Negative for cold intolerance, heat intolerance, polydipsia, polyphagia and polyuria.   Genitourinary: Negative for decreased urine volume, difficulty urinating, dyspareunia, dysuria, enuresis, flank pain, frequency, genital sores, hematuria, menstrual problem, pelvic pain, urgency, vaginal bleeding, vaginal discharge and vaginal pain.   Musculoskeletal: Negative for arthralgias, back pain, gait problem, joint swelling, myalgias, neck pain and neck stiffness.   Skin:  Negative for color change, pallor, rash and wound.   Allergic/Immunologic: Negative for environmental allergies, food allergies and immunocompromised state.   Neurological: Negative for dizziness, tremors, seizures, syncope, facial asymmetry, speech difficulty, weakness, light-headedness, numbness and headaches.   Hematological: Negative for adenopathy. Does not bruise/bleed easily.   Psychiatric/Behavioral: Negative for agitation, behavioral problems, confusion, decreased concentration, dysphoric mood, hallucinations, self-injury, sleep disturbance and suicidal ideas. The patient is not nervous/anxious and is not hyperactive.            Objective:      Physical Exam   Constitutional: She is oriented to person, place, and time. She appears well-developed and well-nourished.   Neck: No thyromegaly present.   Cardiovascular: Normal rate and regular rhythm.   Pulmonary/Chest: Effort normal and breath sounds normal. Right breast exhibits no inverted nipple, no mass, no nipple discharge, no skin change and no tenderness. Left breast exhibits no inverted nipple, no mass, no nipple discharge, no skin change and no tenderness. No breast tenderness or discharge. Breasts are symmetrical.   Abdominal: Soft. Bowel sounds are normal. She exhibits no mass. There is no tenderness. Hernia confirmed negative in the right inguinal area and confirmed negative in the left inguinal area.   Genitourinary: Vagina normal and uterus normal. Rectal exam shows no external hemorrhoid. No breast tenderness or discharge. Uterus is not enlarged and not tender. Cervix exhibits no motion tenderness, no discharge and no friability. Right adnexum displays no mass, no tenderness and no fullness. Left adnexum displays no mass, no tenderness and no fullness. No tenderness in the vagina. No foreign body in the vagina. No vaginal discharge found.   Musculoskeletal: Normal range of motion.   Lymphadenopathy:        Right: No inguinal adenopathy present.         Left: No inguinal adenopathy present.   Neurological: She is alert and oriented to person, place, and time. She has normal reflexes.   Skin: Skin is dry.   Psychiatric: She has a normal mood and affect. Her behavior is normal. Judgment and thought content normal.   Nursing note and vitals reviewed.          Assessment:        1. Encounter for gynecological examination without abnormal finding                Plan:         Ruma was seen today for well woman.    Diagnoses and all orders for this visit:    Encounter for gynecological examination without abnormal finding

## 2020-06-15 LAB
FINAL PATHOLOGIC DIAGNOSIS: NORMAL
Lab: NORMAL

## 2020-06-26 ENCOUNTER — TELEPHONE (OUTPATIENT)
Dept: OBSTETRICS AND GYNECOLOGY | Facility: CLINIC | Age: 50
End: 2020-06-26

## 2020-06-26 DIAGNOSIS — N63.10 LUMP OF RIGHT BREAST: ICD-10-CM

## 2020-06-26 DIAGNOSIS — Z12.39 BREAST CANCER SCREENING: Primary | ICD-10-CM

## 2020-06-26 NOTE — TELEPHONE ENCOUNTER
----- Message from Kierra Patel MA sent at 6/26/2020  4:49 PM CDT -----  Contact: self    ----- Message -----  From: Addie Joseph MA  Sent: 6/26/2020  11:52 AM CDT  To: [facility] Staff    Ruma Dietrich  MRN: [id number]  Home Phone      [phone]  Work Phone      [phone]  Mobile          [phone]    Patient Care Team:  Primary Doctor No as PCP - General  OB? No  What phone number can you be reached at?  [phone]  Message:   Needs new order for dx mammo.  Stated found lump on breast and was advised the order that was given is not for dx mammo.  Please fax mammo order to [facility]

## 2020-06-26 NOTE — TELEPHONE ENCOUNTER
Patient called stating she had right breast pain 1 day after visit on 06/09. Patient states she found a painful lump approx. 1 inch above R nipple. Patient has screening mammo ordered, but requesting dx mammo. Instead. Instructed patient breast lumps are generally evaluated prior to getting orders. Patient states she high has deductible plan and has to pay out of pocket. Patient states hsgonzález has been seen 3 times this year. Patient would like message to be sent to Dr. Guzman to see if orders can be sent without a visit due to out of pocket expense for another clinic visit.

## 2020-08-14 ENCOUNTER — TELEPHONE (OUTPATIENT)
Dept: OBSTETRICS AND GYNECOLOGY | Facility: CLINIC | Age: 50
End: 2020-08-14

## 2021-11-18 ENCOUNTER — OFFICE VISIT (OUTPATIENT)
Dept: OBSTETRICS AND GYNECOLOGY | Facility: CLINIC | Age: 51
End: 2021-11-18
Payer: COMMERCIAL

## 2021-11-18 VITALS
DIASTOLIC BLOOD PRESSURE: 68 MMHG | WEIGHT: 185 LBS | HEIGHT: 63 IN | HEART RATE: 76 BPM | BODY MASS INDEX: 32.78 KG/M2 | SYSTOLIC BLOOD PRESSURE: 122 MMHG

## 2021-11-18 DIAGNOSIS — N95.1 MENOPAUSAL SYMPTOMS: ICD-10-CM

## 2021-11-18 DIAGNOSIS — Z12.31 ENCOUNTER FOR SCREENING MAMMOGRAM FOR MALIGNANT NEOPLASM OF BREAST: Primary | ICD-10-CM

## 2021-11-18 PROCEDURE — 99999 PR PBB SHADOW E&M-EST. PATIENT-LVL III: CPT | Mod: PBBFAC,,, | Performed by: OBSTETRICS & GYNECOLOGY

## 2021-11-18 PROCEDURE — 99999 PR PBB SHADOW E&M-EST. PATIENT-LVL III: ICD-10-PCS | Mod: PBBFAC,,, | Performed by: OBSTETRICS & GYNECOLOGY

## 2021-11-18 PROCEDURE — 99213 OFFICE O/P EST LOW 20 MIN: CPT | Mod: S$GLB,,, | Performed by: OBSTETRICS & GYNECOLOGY

## 2021-11-18 PROCEDURE — 99213 PR OFFICE/OUTPT VISIT, EST, LEVL III, 20-29 MIN: ICD-10-PCS | Mod: S$GLB,,, | Performed by: OBSTETRICS & GYNECOLOGY

## 2021-11-18 RX ORDER — ESTRADIOL 1 MG/1
1 TABLET ORAL DAILY
Qty: 30 TABLET | Refills: 12 | Status: SHIPPED | OUTPATIENT
Start: 2021-11-18 | End: 2023-09-05 | Stop reason: ALTCHOICE

## 2021-11-18 RX ORDER — MEDROXYPROGESTERONE ACETATE 2.5 MG/1
2.5 TABLET ORAL DAILY
Qty: 30 TABLET | Refills: 12 | Status: SHIPPED | OUTPATIENT
Start: 2021-11-18 | End: 2023-09-05 | Stop reason: ALTCHOICE

## 2022-02-15 ENCOUNTER — TELEPHONE (OUTPATIENT)
Dept: OBSTETRICS AND GYNECOLOGY | Facility: CLINIC | Age: 52
End: 2022-02-15
Payer: COMMERCIAL

## 2022-02-15 NOTE — TELEPHONE ENCOUNTER
----- Message from Theresa Gillette sent at 2/15/2022  9:39 AM CST -----  Contact: PATIENT  Ruma Dietrich  MRN: [id number]  : 1970  PCP: Archie Newman  Home Phone      [phone]  Work Phone      [phone]  Mobile          [phone]      MESSAGE: Patient states that she was seen a couple of months of ago and was put on medication to help with the hot flashes.  She states that the medication helped tremendously but she missed a couple of doses and has started having brown spotting and tremendous pain.  She would like to see what Dr. Guzman would recommend for this.        Phone: [phone]

## 2022-02-15 NOTE — TELEPHONE ENCOUNTER
Patient started hormone medication x2 months. Reports is helping her with hotflashes, but about 2 weeks ago missed a dose. States has been having spotting and cramping since then. Reports today spotting is getting a little heavier and darker red with increase in cramping. Feels like cycle would be starting.

## 2022-02-15 NOTE — TELEPHONE ENCOUNTER
Have patient stop her Provera for the next 5 days.  This will likely bring on a menstrual cycle and then she can restart it

## 2022-03-09 ENCOUNTER — LAB VISIT (OUTPATIENT)
Dept: LAB | Facility: HOSPITAL | Age: 52
End: 2022-03-09
Attending: INTERNAL MEDICINE
Payer: COMMERCIAL

## 2022-03-09 DIAGNOSIS — R10.32 LLQ ABDOMINAL PAIN: Primary | ICD-10-CM

## 2022-03-09 LAB
BASOPHILS # BLD AUTO: 0.05 K/UL (ref 0–0.2)
BASOPHILS NFR BLD: 0.8 % (ref 0–1.9)
CRP SERPL-MCNC: 8.3 MG/L (ref 0–8.2)
DIFFERENTIAL METHOD: NORMAL
EOSINOPHIL # BLD AUTO: 0.1 K/UL (ref 0–0.5)
EOSINOPHIL NFR BLD: 1.2 % (ref 0–8)
ERYTHROCYTE [DISTWIDTH] IN BLOOD BY AUTOMATED COUNT: 12 % (ref 11.5–14.5)
HCT VFR BLD AUTO: 42 % (ref 37–48.5)
HGB BLD-MCNC: 13.6 G/DL (ref 12–16)
IMM GRANULOCYTES # BLD AUTO: 0.01 K/UL (ref 0–0.04)
IMM GRANULOCYTES NFR BLD AUTO: 0.2 % (ref 0–0.5)
LYMPHOCYTES # BLD AUTO: 2.5 K/UL (ref 1–4.8)
LYMPHOCYTES NFR BLD: 38.4 % (ref 18–48)
MCH RBC QN AUTO: 30.3 PG (ref 27–31)
MCHC RBC AUTO-ENTMCNC: 32.4 G/DL (ref 32–36)
MCV RBC AUTO: 94 FL (ref 82–98)
MONOCYTES # BLD AUTO: 0.4 K/UL (ref 0.3–1)
MONOCYTES NFR BLD: 5.4 % (ref 4–15)
NEUTROPHILS # BLD AUTO: 3.5 K/UL (ref 1.8–7.7)
NEUTROPHILS NFR BLD: 54 % (ref 38–73)
NRBC BLD-RTO: 0 /100 WBC
PLATELET # BLD AUTO: 237 K/UL (ref 150–450)
PMV BLD AUTO: 10.7 FL (ref 9.2–12.9)
RBC # BLD AUTO: 4.49 M/UL (ref 4–5.4)
WBC # BLD AUTO: 6.44 K/UL (ref 3.9–12.7)

## 2022-03-09 PROCEDURE — 36415 COLL VENOUS BLD VENIPUNCTURE: CPT | Performed by: INTERNAL MEDICINE

## 2022-03-09 PROCEDURE — 86140 C-REACTIVE PROTEIN: CPT | Performed by: INTERNAL MEDICINE

## 2022-03-09 PROCEDURE — 85025 COMPLETE CBC W/AUTO DIFF WBC: CPT | Performed by: INTERNAL MEDICINE

## 2022-03-11 ENCOUNTER — HOSPITAL ENCOUNTER (EMERGENCY)
Facility: HOSPITAL | Age: 52
Discharge: HOME | End: 2022-03-11
Attending: SURGERY
Payer: COMMERCIAL

## 2022-03-11 ENCOUNTER — OFFICE VISIT (OUTPATIENT)
Dept: OBSTETRICS AND GYNECOLOGY | Facility: CLINIC | Age: 52
End: 2022-03-11
Payer: COMMERCIAL

## 2022-03-11 VITALS
DIASTOLIC BLOOD PRESSURE: 74 MMHG | OXYGEN SATURATION: 98 % | RESPIRATION RATE: 18 BRPM | SYSTOLIC BLOOD PRESSURE: 130 MMHG | HEART RATE: 84 BPM | BODY MASS INDEX: 33.25 KG/M2 | TEMPERATURE: 97 F | HEIGHT: 63 IN | WEIGHT: 187.63 LBS

## 2022-03-11 VITALS
BODY MASS INDEX: 33.48 KG/M2 | HEART RATE: 72 BPM | SYSTOLIC BLOOD PRESSURE: 122 MMHG | DIASTOLIC BLOOD PRESSURE: 68 MMHG | WEIGHT: 189 LBS

## 2022-03-11 DIAGNOSIS — R10.2 PELVIC PAIN IN FEMALE: Primary | ICD-10-CM

## 2022-03-11 DIAGNOSIS — D25.9 UTERINE LEIOMYOMA, UNSPECIFIED LOCATION: ICD-10-CM

## 2022-03-11 DIAGNOSIS — R10.9 ABDOMINAL PAIN, UNSPECIFIED ABDOMINAL LOCATION: Primary | ICD-10-CM

## 2022-03-11 DIAGNOSIS — Z12.31 ENCOUNTER FOR SCREENING MAMMOGRAM FOR MALIGNANT NEOPLASM OF BREAST: ICD-10-CM

## 2022-03-11 LAB
ALBUMIN SERPL BCP-MCNC: 4.4 G/DL (ref 3.5–5.2)
ALP SERPL-CCNC: 69 U/L (ref 55–135)
ALT SERPL W/O P-5'-P-CCNC: 16 U/L (ref 10–44)
ANION GAP SERPL CALC-SCNC: 14 MMOL/L (ref 8–16)
AST SERPL-CCNC: 15 U/L (ref 10–40)
BASOPHILS # BLD AUTO: 0.06 K/UL (ref 0–0.2)
BASOPHILS NFR BLD: 0.8 % (ref 0–1.9)
BILIRUB SERPL-MCNC: 0.3 MG/DL (ref 0.1–1)
BILIRUB UR QL STRIP: NEGATIVE
BUN SERPL-MCNC: 11 MG/DL (ref 6–20)
CALCIUM SERPL-MCNC: 9.5 MG/DL (ref 8.7–10.5)
CHLORIDE SERPL-SCNC: 104 MMOL/L (ref 95–110)
CLARITY UR: CLEAR
CO2 SERPL-SCNC: 21 MMOL/L (ref 23–29)
COLOR UR: YELLOW
CREAT SERPL-MCNC: 0.8 MG/DL (ref 0.5–1.4)
DIFFERENTIAL METHOD: NORMAL
EOSINOPHIL # BLD AUTO: 0.1 K/UL (ref 0–0.5)
EOSINOPHIL NFR BLD: 0.9 % (ref 0–8)
ERYTHROCYTE [DISTWIDTH] IN BLOOD BY AUTOMATED COUNT: 12.1 % (ref 11.5–14.5)
EST. GFR  (AFRICAN AMERICAN): >60 ML/MIN/1.73 M^2
EST. GFR  (NON AFRICAN AMERICAN): >60 ML/MIN/1.73 M^2
GLUCOSE SERPL-MCNC: 112 MG/DL (ref 70–110)
GLUCOSE UR QL STRIP: NEGATIVE
HCT VFR BLD AUTO: 43.7 % (ref 37–48.5)
HGB BLD-MCNC: 14.2 G/DL (ref 12–16)
HGB UR QL STRIP: NEGATIVE
IMM GRANULOCYTES # BLD AUTO: 0.03 K/UL (ref 0–0.04)
IMM GRANULOCYTES NFR BLD AUTO: 0.4 % (ref 0–0.5)
KETONES UR QL STRIP: NEGATIVE
LEUKOCYTE ESTERASE UR QL STRIP: NEGATIVE
LIPASE SERPL-CCNC: 16 U/L (ref 4–60)
LYMPHOCYTES # BLD AUTO: 2.9 K/UL (ref 1–4.8)
LYMPHOCYTES NFR BLD: 35.9 % (ref 18–48)
MCH RBC QN AUTO: 30.4 PG (ref 27–31)
MCHC RBC AUTO-ENTMCNC: 32.5 G/DL (ref 32–36)
MCV RBC AUTO: 94 FL (ref 82–98)
MONOCYTES # BLD AUTO: 0.4 K/UL (ref 0.3–1)
MONOCYTES NFR BLD: 4.9 % (ref 4–15)
NEUTROPHILS # BLD AUTO: 4.5 K/UL (ref 1.8–7.7)
NEUTROPHILS NFR BLD: 57.1 % (ref 38–73)
NITRITE UR QL STRIP: NEGATIVE
NRBC BLD-RTO: 0 /100 WBC
PH UR STRIP: 6 [PH] (ref 5–8)
PLATELET # BLD AUTO: 272 K/UL (ref 150–450)
PMV BLD AUTO: 10.8 FL (ref 9.2–12.9)
POTASSIUM SERPL-SCNC: 3.1 MMOL/L (ref 3.5–5.1)
PROT SERPL-MCNC: 7.9 G/DL (ref 6–8.4)
PROT UR QL STRIP: NEGATIVE
RBC # BLD AUTO: 4.67 M/UL (ref 4–5.4)
SODIUM SERPL-SCNC: 139 MMOL/L (ref 136–145)
SP GR UR STRIP: >=1.03 (ref 1–1.03)
URN SPEC COLLECT METH UR: ABNORMAL
UROBILINOGEN UR STRIP-ACNC: NEGATIVE EU/DL
WBC # BLD AUTO: 7.93 K/UL (ref 3.9–12.7)

## 2022-03-11 PROCEDURE — 80053 COMPREHEN METABOLIC PANEL: CPT | Performed by: SURGERY

## 2022-03-11 PROCEDURE — 99285 EMERGENCY DEPT VISIT HI MDM: CPT | Mod: 25

## 2022-03-11 PROCEDURE — 83690 ASSAY OF LIPASE: CPT | Performed by: SURGERY

## 2022-03-11 PROCEDURE — 99999 PR PBB SHADOW E&M-EST. PATIENT-LVL III: ICD-10-PCS | Mod: PBBFAC,,, | Performed by: OBSTETRICS & GYNECOLOGY

## 2022-03-11 PROCEDURE — 85025 COMPLETE CBC W/AUTO DIFF WBC: CPT | Performed by: SURGERY

## 2022-03-11 PROCEDURE — 99999 PR PBB SHADOW E&M-EST. PATIENT-LVL III: CPT | Mod: PBBFAC,,, | Performed by: OBSTETRICS & GYNECOLOGY

## 2022-03-11 PROCEDURE — 25500020 PHARM REV CODE 255: Performed by: SURGERY

## 2022-03-11 PROCEDURE — 99213 OFFICE O/P EST LOW 20 MIN: CPT | Mod: S$GLB,,, | Performed by: OBSTETRICS & GYNECOLOGY

## 2022-03-11 PROCEDURE — 81003 URINALYSIS AUTO W/O SCOPE: CPT | Performed by: SURGERY

## 2022-03-11 PROCEDURE — 99213 PR OFFICE/OUTPT VISIT, EST, LEVL III, 20-29 MIN: ICD-10-PCS | Mod: S$GLB,,, | Performed by: OBSTETRICS & GYNECOLOGY

## 2022-03-11 RX ORDER — HYDROCODONE BITARTRATE AND ACETAMINOPHEN 5; 325 MG/1; MG/1
1 TABLET ORAL EVERY 4 HOURS PRN
Qty: 20 TABLET | Refills: 0 | Status: SHIPPED | OUTPATIENT
Start: 2022-03-11 | End: 2022-04-11 | Stop reason: SDUPTHER

## 2022-03-11 RX ORDER — ONDANSETRON 2 MG/ML
4 INJECTION INTRAMUSCULAR; INTRAVENOUS
Status: DISCONTINUED | OUTPATIENT
Start: 2022-03-11 | End: 2022-03-12 | Stop reason: HOSPADM

## 2022-03-11 RX ORDER — HYDROMORPHONE HYDROCHLORIDE 1 MG/ML
1 INJECTION, SOLUTION INTRAMUSCULAR; INTRAVENOUS; SUBCUTANEOUS
Status: DISCONTINUED | OUTPATIENT
Start: 2022-03-11 | End: 2022-03-12 | Stop reason: HOSPADM

## 2022-03-11 RX ADMIN — IOHEXOL 75 ML: 350 INJECTION, SOLUTION INTRAVENOUS at 09:03

## 2022-03-11 NOTE — PROGRESS NOTES
Subjective:       Patient ID: Ruma Dietrich is a 52 y.o. female.    Chief Complaint:  Pelvic Pain (Approx 1 month on/off) and irreg spotting (Most days)      History of Present Illness  Patient presents complaining of some pelvic pain mostly right-sided for approximately 1 month.  Patient has been on hormone replacement therapy and has been having daily spotting.  She did stop her hormone replacement therapy for 5 days and had a small menstrual cycle.  After that she return to having daily spotting.  Patient states the pain started on her right side but spreads midline.  She states it is mostly at night.  She has started to become concerned that it may be from her GI tract.  She has a follow-up scheduled with her GI doctor.    Menstrual History:  OB History        4    Para   2    Term   2            AB   1    Living   2       SAB   1    IAB        Ectopic        Multiple        Live Births   2                Menarche age:  Patient's last menstrual period was 2022 (approximate).         Review of Systems  Review of Systems   Constitutional: Negative for activity change, appetite change, chills, diaphoresis, fatigue, fever and unexpected weight change.   HENT: Negative for congestion, dental problem, drooling, ear discharge, ear pain, facial swelling, hearing loss, mouth sores, nosebleeds, postnasal drip, rhinorrhea, sinus pressure, sneezing, sore throat, tinnitus, trouble swallowing and voice change.    Eyes: Negative for photophobia, pain, discharge, redness, itching and visual disturbance.   Respiratory: Negative for apnea, cough, choking, chest tightness, shortness of breath, wheezing and stridor.    Cardiovascular: Negative for chest pain, palpitations and leg swelling.   Gastrointestinal: Positive for abdominal distention, abdominal pain and diarrhea. Negative for anal bleeding, blood in stool, constipation, nausea, rectal pain and vomiting.   Endocrine: Negative for cold intolerance, heat  intolerance, polydipsia, polyphagia and polyuria.   Genitourinary: Positive for pelvic pain and vaginal bleeding. Negative for decreased urine volume, difficulty urinating, dyspareunia, dysuria, enuresis, flank pain, frequency, genital sores, hematuria, menstrual problem, urgency, vaginal discharge and vaginal pain.   Musculoskeletal: Negative for arthralgias, back pain, gait problem, joint swelling, myalgias, neck pain and neck stiffness.   Skin: Negative for color change, pallor, rash and wound.   Allergic/Immunologic: Negative for environmental allergies, food allergies and immunocompromised state.   Neurological: Negative for dizziness, tremors, seizures, syncope, facial asymmetry, speech difficulty, weakness, light-headedness, numbness and headaches.   Hematological: Negative for adenopathy. Does not bruise/bleed easily.   Psychiatric/Behavioral: Negative for agitation, behavioral problems, confusion, decreased concentration, dysphoric mood, hallucinations, self-injury, sleep disturbance and suicidal ideas. The patient is not nervous/anxious and is not hyperactive.            Objective:      Physical Exam  Vitals and nursing note reviewed. Exam conducted with a chaperone present.   Abdominal:      Hernia: There is no hernia in the left inguinal area or right inguinal area.   Genitourinary:     General: Normal vulva.      Labia:         Right: No rash, tenderness, lesion or injury.         Left: No rash, tenderness, lesion or injury.       Urethra: No prolapse, urethral pain, urethral swelling or urethral lesion.      Vagina: No signs of injury and foreign body. No vaginal discharge, erythema, tenderness or bleeding.      Cervix: No cervical motion tenderness, discharge or friability.      Uterus: Not deviated, not enlarged, not fixed and not tender.       Adnexa:         Right: No mass, tenderness or fullness.          Left: No mass, tenderness or fullness.        Rectum: No external hemorrhoid.    Lymphadenopathy:      Lower Body: No right inguinal adenopathy. No left inguinal adenopathy.             Assessment:        1. Pelvic pain in female    2. Encounter for screening mammogram for malignant neoplasm of breast                Plan:     treatment options discussed.  Patient will keep her follow-up with GI and consider her options     Ruma was seen today for pelvic pain and irreg spotting.    Diagnoses and all orders for this visit:    Pelvic pain in female  -     US Pelvis Complete Non OB; Future    Encounter for screening mammogram for malignant neoplasm of breast  -     Mammo Digital Screening Bilat w/ Riley; Future

## 2022-03-12 NOTE — ED PROVIDER NOTES
[facility]                                                 It was a pleasure treating Ruma Dietrich in the ED at [facility] in [place]:    Chief Complaint  52 y.o. female with Abdominal Pain (LLQ pain onset over one month )    History of Present Illness  Ruma Dietrich presents to the emergency room with left lower quadrant pain  Patient with left lower quadrant pain over last month, saw OBGYN today  Patient is also seen Gastroenterology Clinic, no diagnosis given per patient  Described a throbbing left lower quadrant pain, denies hematuria or dysuria  Patient has no history kidney stone, patient has no history of diverticulitis  Patient is not sexually active denies any risk for STD or PID this afternoon    The history is provided by the patient  Previous medical records were obtained from [facility]  Previous records are summarized from prior ER visits and hospitalizations  Medical history significant for chronic kidney disease and GERD  Surgeries: Breast augmentation, , kidney surgery, BTL, ureter   Allergic to clindamycin, clarithromycin, penicillin, sulfa  No significant family history  No significant social history, nonsmoker    I reviewed the ER triage nurse's note before evaluating the patient  I have reviewed all of this patient's past medical, surgical, family, and social   histories as well as active allergies and medications documented in the  electronic medical record    Review of Systems and Physical Exam      Review of Systems (all other ROS are otherwise negative)  -- Constitution - no fever, denies fatigue, no weakness, no chills, night sweats  -- Eyes - no tearing or redness, no visual disturbance  -- Ear, Nose - no tinnitus or earache, no nasal congestion or discharge  -- Mouth,Throat - no sore throat, no toothache, normal voice, normal swallowing  -- Respiratory - denies cough and congestion, no shortness of breath, no JOSEPH  -- Cardiovascular - denies chest pain, no  palpitations, denies claudication  -- Gastrointestinal - LLQ abdominal pain, no nausea, vomiting, or diarrhea  -- Genitourinary - no dysuria, no hematuria, no flank pain, no bladder pain  -- Musculoskeletal - denies back pain, negative for trauma or injury  -- Neurological - no headache, no numbness, tingling, seizure, balance issues  -- Hematologic- no bruising, no bleeding, nose bleed, bleeding disorders  -- Lymphatics - no leg swelling, no tender nodes, no swollen nodes or LAD    Vital Signs (reviewed by the physician)  Her oral temperature is 96.8 °F (36 °C).   Her blood pressure is 128/70 and her pulse is 89.   Her respiration is 18 and oxygen saturation is 98%.     Physical Exam  -- Nursing note and vitals reviewed  -- Constitutional: Appears well-developed and well-nourished  -- Head: Atraumatic. Normocephalic. No obvious abnormality  -- Eyes: Pupils are equal and reactive to light. Normal conjunctiva and lids  -- Cardiac: Normal rate, regular rhythm and normal heart sounds  -- Respiratory: Normal respiratory effort, breath sounds clear to auscultation  -- Gastrointestinal: Soft, no tenderness. Normal bowel sounds. Normal liver edge  -- Musculoskeletal: Normal range of motion, no effusions. Joints stable   -- Neurological: No focal deficits. Showed good interaction with staff  -- Vascular: Posterior tibial, dorsalis pedis and radial pulses 2+ bilaterally      Emergency Room Course      Lab Results (reviewed by the physician)     K 3.1 (L)      CO2 21 (L)   BUN 11   CREATININE 0.8    (H)   ALKPHOS 69   AST 15   ALT 16   BILITOT 0.3   ALBUMIN 4.4   PROT 7.9   WBC 7.93   HGB 14.2   HCT 43.7        Urinalysis (reviewed by the physician)  -- Urinalysis performed during this ER visit showed no signs of infection      CT abdomen pelvis (images visualized & reports reviewed by MD)  1. Large complex lobular uterine mass at the fundus measuring approximately 7.5 cm.  See above comments.  The  mass is nonspecific.  This could represent a large uterine fibroid.  Neoplastic process of the uterus, endometrium or ovary could not be excluded.  Recommend gyn follow-up.   2. 3.2 cm probable left ovarian cyst.  Recommend surveillance.   3. Diverticulosis of the sigmoid colon.   4. Hepatomegaly with probable small 1.8 cm hemangioma in the right lobe.  See above comments.   5. Small hiatal hernia.   6. Splenomegaly.   7. Nonobstructing nephrolithiasis of the right kidney.   8. Small fat containing umbilical hernia.   9.  This report was flagged in Epic as abnormal.     Medications Given  HYDROmorphone injection 1 mg (0 mg Intravenous Hold 3/11/22 2230)   ondansetron injection 4 mg (0 mg Intravenous Hold 3/11/22 2230)   iohexoL (OMNIPAQUE 350) injection 75 mL (75 mLs Intravenous Given 3/11/22 2124)     Medical Decision Making     History  -- History was obtained from the patient  -- Previous medical records were obtained from [facility]  -- Previous records are summarized from prior ER visits and hospitalizations    Initial Assessment  -- left lower quadrant abdominal pain for last month  -- saw Gastroenterology with referral OBGYN afterwards  -- saw OBGYN today was referred back to gastroenterology  -- throbbing dull left lower quadrant pain with no dysuria hematuria  -- patient has no history of kidney stones or diverticulitis reported today    Differential Diagnosis  -- pelvic pain, diverticulitis, kidney stone, colitis, constipation, acute abdomen    Clinical Tests  -- Lab tests were ordered, interpreted, and reviewed in the ER today   -- Radiology tests were ordered, visualized, interpreted, reviewed in the ER today    ED Course/ED Management  -- patient with left lower quadrant abdominal pain with normal labs today  -- CT scan shows uterine fibroid, needs OBGYN follow-up outpatient  -- pain controlled in the emergency room, counseled on diagnosis today  -- will return to the ER with any concerning signs symptoms  after discharge    Assessment, Disposition, & Plan      Diagnosis  [R10.9] Abdominal pain  [D25.9] Uterine leiomyoma    Disposition and Plan  -- Disposition: home  -- Condition: stable  -- Follow-up: Patient to follow up with Archie Newman MD in 1-2 days.  -- I advised the patient that we have found no life threatening condition today  -- At this time, I believe the patient is clinically stable for discharge.   -- Pt understands that the visit today is to address immediate concerns   -- Further workup and evaluation as an outpatient may be required  -- The patient acknowledges that close follow up with a MD is required   -- Patient agrees to comply with all instruction and direction given in the ER    This note is dictated on M*Modal word recognition program.  There are word recognition mistakes that are occasionally missed on review.         Lavon Smith MD  03/11/22 6791

## 2022-03-12 NOTE — ED TRIAGE NOTES
Patient report abdominal pain onset LLQ radiates to RLQ. Dr. Guzman, GYN today Dr. Menchaca, GI on Monday. Diarrhea and constipation on most days. Last meal 3 PM.

## 2022-03-14 ENCOUNTER — TELEPHONE (OUTPATIENT)
Dept: OBSTETRICS AND GYNECOLOGY | Facility: CLINIC | Age: 52
End: 2022-03-14

## 2022-03-14 NOTE — TELEPHONE ENCOUNTER
----- Message from Theresa Gillette sent at 3/14/2022  8:58 AM CDT -----  Contact: PATIENT  Ruma Dietrich  MRN: [id number]  : 1970  PCP: Archie Newman  Home Phone      [phone]  Work Phone      [phone]  Mobile          [phone]    MESSAGE:  Patient states that she was seen Friday morning  by Dr. Guzman and went to the ER Friday night for severe abdomen pain.  She states that they did a CT scan and a large mass was seen.  She would like Dr. Guzman to look at the CT Scan and give his recommendations.        Phone: [phone]

## 2022-03-14 NOTE — TELEPHONE ENCOUNTER
Patient seen in clinic Friday for evaluation of pelvic pain. States pain was worse and went to ED where she had CT done. Would like for you to review CT. Patient canceled ultrasound she had scheduled.

## 2022-03-14 NOTE — TELEPHONE ENCOUNTER
----- Message from Nishi Mendiola MD sent at 3/4/2021 12:31 PM CST -----  Neg pap. There is mention of yeast on pap. Send rx for diflucan 150 mg po once. #1, no refills. GC/chlam test negative   Attempted to contact patient. Phone disconnected. Will try again.

## 2022-03-16 ENCOUNTER — TELEPHONE (OUTPATIENT)
Dept: OBSTETRICS AND GYNECOLOGY | Facility: CLINIC | Age: 52
End: 2022-03-16
Payer: COMMERCIAL

## 2022-03-16 NOTE — TELEPHONE ENCOUNTER
Patient wants ultrasound Friday 3/18/22. Informed patient no availabilities here in clinic or at [facility] that day. States she will contact another facility  To see if she can get in sooner. Does have appt scheduled 3/21 with Dr. Guzman to follow up.

## 2022-03-16 NOTE — TELEPHONE ENCOUNTER
----- Message from Miguel Calvin sent at 3/16/2022  3:06 PM CDT -----  Contact: self  Ruma Dietrich  MRN: [id number]  Home Phone      [phone]  Work Phone      [phone]  Mobile          [phone]    Patient Care Team:  Archie Newman MD as PCP - General (Family Medicine)  OB? No  What phone number can you be reached at? [phone]  Message:   Patient is stating she is still waiting on call from nurse to see what dr villegas wants her to do. Please return call.

## 2022-03-16 NOTE — TELEPHONE ENCOUNTER
Patient wants ultrasound Friday 3/18/22. Informed patient no availabilities here in clinic or at [facility] that day. States she will contact another facility  To see if she can get in sooner. Does have appt scheduled 3/21 with Dr. Guzman to follow up.

## 2022-03-16 NOTE — TELEPHONE ENCOUNTER
Please schedule patient for pelvic u/s and follow up with Dr. Guzman to discuss surgical options.

## 2022-03-16 NOTE — TELEPHONE ENCOUNTER
----- Message from Theresa Gillette sent at 3/16/2022  3:50 PM CDT -----  Contact: PATIENT  Ruma Dietrich  MRN: [id number]  : 1970  PCP: Archie Newman  Home Phone      [phone]  Work Phone      [phone]  Mobile          [phone]      MESSAGE: Patient would like the order for her Ultrasound be sent to [facility] fax #[phone].          Phone: [phone]

## 2022-03-18 ENCOUNTER — HOSPITAL ENCOUNTER (OUTPATIENT)
Dept: RADIOLOGY | Facility: OTHER | Age: 52
Discharge: HOME | End: 2022-03-18
Attending: OBSTETRICS & GYNECOLOGY
Payer: COMMERCIAL

## 2022-03-18 DIAGNOSIS — R10.2 PELVIC PAIN IN FEMALE: ICD-10-CM

## 2022-03-18 PROCEDURE — 76830 TRANSVAGINAL US NON-OB: CPT | Mod: TC

## 2022-03-18 PROCEDURE — 76856 US EXAM PELVIC COMPLETE: CPT | Mod: 26,,, | Performed by: RADIOLOGY

## 2022-03-18 PROCEDURE — 76830 TRANSVAGINAL US NON-OB: CPT | Mod: 26,,, | Performed by: RADIOLOGY

## 2022-03-18 PROCEDURE — 76830 US PELVIS COMP WITH TRANSVAG NON-OB (XPD): ICD-10-PCS | Mod: 26,,, | Performed by: RADIOLOGY

## 2022-03-18 PROCEDURE — 76856 US PELVIS COMP WITH TRANSVAG NON-OB (XPD): ICD-10-PCS | Mod: 26,,, | Performed by: RADIOLOGY

## 2022-03-21 ENCOUNTER — TELEPHONE (OUTPATIENT)
Dept: OBSTETRICS AND GYNECOLOGY | Facility: CLINIC | Age: 52
End: 2022-03-21
Payer: COMMERCIAL

## 2022-03-21 ENCOUNTER — OFFICE VISIT (OUTPATIENT)
Dept: OBSTETRICS AND GYNECOLOGY | Facility: CLINIC | Age: 52
End: 2022-03-21
Payer: COMMERCIAL

## 2022-03-21 VITALS
WEIGHT: 190 LBS | SYSTOLIC BLOOD PRESSURE: 136 MMHG | HEART RATE: 70 BPM | DIASTOLIC BLOOD PRESSURE: 82 MMHG | BODY MASS INDEX: 33.66 KG/M2

## 2022-03-21 DIAGNOSIS — R93.89 ABNORMAL ULTRASOUND: ICD-10-CM

## 2022-03-21 DIAGNOSIS — R10.2 PELVIC PAIN IN FEMALE: Primary | ICD-10-CM

## 2022-03-21 DIAGNOSIS — D25.0 SUBMUCOUS LEIOMYOMA OF UTERUS: ICD-10-CM

## 2022-03-21 PROCEDURE — 99213 OFFICE O/P EST LOW 20 MIN: CPT | Mod: S$GLB,,, | Performed by: OBSTETRICS & GYNECOLOGY

## 2022-03-21 PROCEDURE — 99999 PR PBB SHADOW E&M-EST. PATIENT-LVL IV: ICD-10-PCS | Mod: PBBFAC,,, | Performed by: OBSTETRICS & GYNECOLOGY

## 2022-03-21 PROCEDURE — 99213 PR OFFICE/OUTPT VISIT, EST, LEVL III, 20-29 MIN: ICD-10-PCS | Mod: S$GLB,,, | Performed by: OBSTETRICS & GYNECOLOGY

## 2022-03-21 PROCEDURE — 99999 PR PBB SHADOW E&M-EST. PATIENT-LVL IV: CPT | Mod: PBBFAC,,, | Performed by: OBSTETRICS & GYNECOLOGY

## 2022-03-21 NOTE — PROGRESS NOTES
Subjective:       Patient ID: Ruma Dietrich is a 52 y.o. female.    Chief Complaint:  Pelvic Pain (CT done last week, discuss results and possible surgery)      History of Present Illness  Patient presents complaining of continued pelvic pain.  Patient will have pain that will shoot across her lower abdomen.  Patient has recently had a CT scan and a follow-up ultrasound.  Ultrasound reveals a small uterine fibroid but also a collection of fluid inside of the endometrial cavity.  Patient is status post endometrial ablation.  After counseling patient would like to proceed with hysterectomy.  We will plan to do a frozen section D&C followed by a laparoscopic hysterectomy    Menstrual History:  OB History        4    Para   2    Term   2            AB   1    Living   2       SAB   1    IAB        Ectopic        Multiple        Live Births   2                Menarche age:  Patient's last menstrual period was 2022 (approximate).         Review of Systems  Review of Systems   Constitutional: Negative for activity change, appetite change, chills, diaphoresis, fatigue, fever and unexpected weight change.   HENT: Negative for congestion, dental problem, drooling, ear discharge, ear pain, facial swelling, hearing loss, mouth sores, nosebleeds, postnasal drip, rhinorrhea, sinus pressure, sneezing, sore throat, tinnitus, trouble swallowing and voice change.    Eyes: Negative for photophobia, pain, discharge, redness, itching and visual disturbance.   Respiratory: Negative for apnea, cough, choking, chest tightness, shortness of breath, wheezing and stridor.    Cardiovascular: Negative for chest pain, palpitations and leg swelling.   Gastrointestinal: Positive for abdominal pain. Negative for abdominal distention, anal bleeding, blood in stool, constipation, diarrhea, nausea, rectal pain and vomiting.   Endocrine: Negative for cold intolerance, heat intolerance, polydipsia, polyphagia and polyuria.    Genitourinary: Positive for pelvic pain and vaginal bleeding. Negative for decreased urine volume, difficulty urinating, dyspareunia, dysuria, enuresis, flank pain, frequency, genital sores, hematuria, menstrual problem, urgency, vaginal discharge and vaginal pain.   Musculoskeletal: Negative for arthralgias, back pain, gait problem, joint swelling, myalgias, neck pain and neck stiffness.   Skin: Negative for color change, pallor, rash and wound.   Allergic/Immunologic: Negative for environmental allergies, food allergies and immunocompromised state.   Neurological: Negative for dizziness, tremors, seizures, syncope, facial asymmetry, speech difficulty, weakness, light-headedness, numbness and headaches.   Hematological: Negative for adenopathy. Does not bruise/bleed easily.   Psychiatric/Behavioral: Negative for agitation, behavioral problems, confusion, decreased concentration, dysphoric mood, hallucinations, self-injury, sleep disturbance and suicidal ideas. The patient is not nervous/anxious and is not hyperactive.            Objective:      Physical Exam  Vitals and nursing note reviewed.             Assessment:        1. Pelvic pain in female    2. Abnormal ultrasound    3. Submucous leiomyoma of uterus                Plan:         Ruma was seen today for pelvic pain.    Diagnoses and all orders for this visit:    Pelvic pain in female  -     Case Request Operating Room: DILATION AND CURETTAGE, UTERUS, HYSTERECTOMY, TOTAL, LAPAROSCOPIC, SALPINGO-OOPHORECTOMY, LAPAROSCOPIC    Abnormal ultrasound  -     Case Request Operating Room: DILATION AND CURETTAGE, UTERUS, HYSTERECTOMY, TOTAL, LAPAROSCOPIC, SALPINGO-OOPHORECTOMY, LAPAROSCOPIC    Submucous leiomyoma of uterus  -     Case Request Operating Room: DILATION AND CURETTAGE, UTERUS, HYSTERECTOMY, TOTAL, LAPAROSCOPIC, SALPINGO-OOPHORECTOMY, LAPAROSCOPIC

## 2022-03-21 NOTE — TELEPHONE ENCOUNTER
D&C with frozen section with possible [facility] BSO scheduled for 4/28/2022 with pre-op, pre-admit and post op appointments scheduled and discussed with patient.  Pt verbalized understanding.  Case request number [id number].  Staci in surgery department notified of date and time.    Contacted Noemy August in [facility] to schedule pathologist.  Email sent to Tanisha and Noemy.

## 2022-04-07 ENCOUNTER — TELEPHONE (OUTPATIENT)
Dept: OBSTETRICS AND GYNECOLOGY | Facility: CLINIC | Age: 52
End: 2022-04-07
Payer: COMMERCIAL

## 2022-04-07 NOTE — TELEPHONE ENCOUNTER
----- Message from Miguel Calvin sent at 4/7/2022  4:34 PM CDT -----  Contact: self  Ruma Dietrich  MRN: [id number]  Home Phone      [phone]  Work Phone      [phone]  Mobile          [phone]    Patient Care Team:  Archie Newman MD as PCP - General (Family Medicine)  OB? No  What phone number can you be reached at? [phone]  Message: Patient is stating she is having really heavy bleeding today and lots of severe pain. Please return call.

## 2022-04-07 NOTE — TELEPHONE ENCOUNTER
Pt was called and she complained of intermittent pain (10 on pain scale at times, 0 pain at this time) and had heavy vaginal bleeding with clots @ 2:45pm today while using the restroom. Pt not bleeding as heavy and is not saturating one pad in an hour. Offered pt appt tomorrow for evaluation, but pt declined. Pt instructed to report to ER if pain worsens/persists and she starts saturating one pad or more in an hour. Pt voiced understanding.

## 2022-04-11 RX ORDER — HYDROCODONE BITARTRATE AND ACETAMINOPHEN 5; 325 MG/1; MG/1
1 TABLET ORAL EVERY 4 HOURS PRN
Qty: 20 TABLET | Refills: 0 | Status: SHIPPED | OUTPATIENT
Start: 2022-04-11 | End: 2022-04-13

## 2022-04-11 NOTE — TELEPHONE ENCOUNTER
----- Message from Addie Joseph MA sent at 4/11/2022  8:40 AM CDT -----  Contact: SELF  Rmua Dietrich  MRN: [id number]  Home Phone      [phone]  Work Phone      [phone]  Mobile          [phone]    Patient Care Team:  Archie Newman MD as PCP - General (Family Medicine)  OB? No  What phone number can you be reached at?  [phone]  Message: Would like to see about getting Rx called in for HYDROcodone.  Pharmacy:  [facility]

## 2022-04-11 NOTE — TELEPHONE ENCOUNTER
Pt called back and she desires refill of norco 5-325mg. Rx originally given in ER on 3/11/22. Pt has a couple pills left, but will be going to [place] Wednesday and desires to have some called in just in case she needs to take one. Pt has D&C with Laparoscopic Hysterectomy with BSO scheduled for 4/28/22.

## 2022-04-18 NOTE — TELEPHONE ENCOUNTER
Pt cancelled surgery scheduled for 4/28/2022 due to her financial responsibility.  Pt has H BSO scheduled at [facility] on 5/11/2022.  Tanisha in lab notified via e-mail to cancel pathologist and Staci in surgery notified to cancel surgery. Instructed patient the price quoted for $6000 is due to her deductible and out of pocket expense therefore it will likely be the same cost at [facility].  Informed patient she could [facility], patient states she does not desire options with [facility].

## 2022-05-11 PROBLEM — D64.9 ANEMIA: Status: RESOLVED | Noted: 2017-09-05 | Resolved: 2022-05-11

## 2022-05-11 PROBLEM — N85.2 ENLARGED UTERUS: Chronic | Status: ACTIVE | Noted: 2022-05-11

## 2022-05-11 PROBLEM — N95.0 POSTMENOPAUSAL VAGINAL BLEEDING: Chronic | Status: RESOLVED | Noted: 2022-05-11 | Resolved: 2022-05-11

## 2022-05-11 PROBLEM — N92.0 MENORRHAGIA WITH REGULAR CYCLE: Status: RESOLVED | Noted: 2017-09-08 | Resolved: 2022-05-11

## 2022-05-11 PROBLEM — D25.2 SUBSEROUS LEIOMYOMA OF UTERUS: Chronic | Status: ACTIVE | Noted: 2022-05-11

## 2022-05-11 PROBLEM — N73.6 PELVIC ADHESIONS: Chronic | Status: ACTIVE | Noted: 2022-05-11

## 2022-05-11 PROBLEM — N73.6 PELVIC ADHESIONS: Chronic | Status: RESOLVED | Noted: 2022-05-11 | Resolved: 2022-05-11

## 2022-05-11 PROBLEM — R93.89 ABNORMAL PELVIC ULTRASOUND: Chronic | Status: ACTIVE | Noted: 2022-05-11

## 2022-05-11 PROBLEM — R10.2 PELVIC PAIN IN FEMALE: Chronic | Status: ACTIVE | Noted: 2022-05-11

## 2022-05-11 PROBLEM — R10.2 PELVIC PAIN IN FEMALE: Chronic | Status: RESOLVED | Noted: 2022-05-11 | Resolved: 2022-05-11

## 2022-05-11 PROBLEM — N95.0 POSTMENOPAUSAL VAGINAL BLEEDING: Chronic | Status: ACTIVE | Noted: 2022-05-11

## 2022-05-11 PROBLEM — D25.2 SUBSEROUS LEIOMYOMA OF UTERUS: Chronic | Status: RESOLVED | Noted: 2022-05-11 | Resolved: 2022-05-11

## 2022-05-11 PROBLEM — Z98.890 HISTORY OF ENDOMETRIAL ABLATION: Chronic | Status: RESOLVED | Noted: 2022-05-11 | Resolved: 2022-05-11

## 2022-05-11 PROBLEM — R93.89 ABNORMAL PELVIC ULTRASOUND: Chronic | Status: RESOLVED | Noted: 2022-05-11 | Resolved: 2022-05-11

## 2022-05-11 PROBLEM — N85.2 ENLARGED UTERUS: Chronic | Status: RESOLVED | Noted: 2022-05-11 | Resolved: 2022-05-11

## 2022-05-11 PROBLEM — Z98.890 HISTORY OF ENDOMETRIAL ABLATION: Chronic | Status: ACTIVE | Noted: 2022-05-11

## 2022-05-19 ENCOUNTER — LAB VISIT (OUTPATIENT)
Dept: LAB | Facility: HOSPITAL | Age: 52
End: 2022-05-19
Attending: PHYSICIAN ASSISTANT
Payer: COMMERCIAL

## 2022-05-19 DIAGNOSIS — Z11.1 ENCOUNTER FOR SCREENING FOR RESPIRATORY TUBERCULOSIS: Primary | ICD-10-CM

## 2022-05-19 PROCEDURE — 86480 TB TEST CELL IMMUN MEASURE: CPT | Performed by: PHYSICIAN ASSISTANT

## 2022-05-20 LAB
GAMMA INTERFERON BACKGROUND BLD IA-ACNC: 0.01 IU/ML
M TB IFN-G CD4+ BCKGRND COR BLD-ACNC: 0.01 IU/ML
MITOGEN IGNF BCKGRD COR BLD-ACNC: 9.99 IU/ML
TB GOLD PLUS: NEGATIVE
TB2 - NIL: 0.02 IU/ML

## 2022-07-29 ENCOUNTER — LAB VISIT (OUTPATIENT)
Dept: LAB | Facility: HOSPITAL | Age: 52
End: 2022-07-29
Attending: NURSE PRACTITIONER
Payer: COMMERCIAL

## 2022-07-29 DIAGNOSIS — Z78.0 MENOPAUSE: ICD-10-CM

## 2022-07-29 PROCEDURE — 36415 COLL VENOUS BLD VENIPUNCTURE: CPT | Performed by: NURSE PRACTITIONER

## 2022-07-29 PROCEDURE — 82670 ASSAY OF TOTAL ESTRADIOL: CPT | Performed by: NURSE PRACTITIONER

## 2022-07-30 LAB — ESTRADIOL SERPL-MCNC: 30 PG/ML

## 2022-11-25 ENCOUNTER — LAB VISIT (OUTPATIENT)
Dept: LAB | Facility: HOSPITAL | Age: 52
End: 2022-11-25
Attending: PHYSICIAN ASSISTANT
Payer: COMMERCIAL

## 2022-11-25 DIAGNOSIS — R19.7 DIARRHEA: Primary | ICD-10-CM

## 2022-11-25 PROCEDURE — 87046 STOOL CULTR AEROBIC BACT EA: CPT | Performed by: PHYSICIAN ASSISTANT

## 2022-11-25 PROCEDURE — 87209 SMEAR COMPLEX STAIN: CPT | Performed by: PHYSICIAN ASSISTANT

## 2022-11-25 PROCEDURE — 87045 FECES CULTURE AEROBIC BACT: CPT | Performed by: PHYSICIAN ASSISTANT

## 2022-11-25 PROCEDURE — 87449 NOS EACH ORGANISM AG IA: CPT | Performed by: PHYSICIAN ASSISTANT

## 2022-11-25 PROCEDURE — 87177 OVA AND PARASITES SMEARS: CPT | Performed by: PHYSICIAN ASSISTANT

## 2022-11-25 PROCEDURE — 87427 SHIGA-LIKE TOXIN AG IA: CPT | Mod: 59 | Performed by: PHYSICIAN ASSISTANT

## 2022-11-26 LAB
C DIFF GDH STL QL: NEGATIVE
C DIFF TOX A+B STL QL IA: NEGATIVE

## 2022-11-27 LAB
E COLI SXT1 STL QL IA: NEGATIVE
E COLI SXT2 STL QL IA: NEGATIVE

## 2022-11-28 LAB — BACTERIA STL CULT: NORMAL

## 2022-11-29 LAB — O+P STL MICRO: NORMAL

## 2023-08-14 ENCOUNTER — HOSPITAL ENCOUNTER (OUTPATIENT)
Dept: RADIOLOGY | Facility: HOSPITAL | Age: 53
Discharge: HOME | End: 2023-08-14
Attending: NURSE PRACTITIONER
Payer: COMMERCIAL

## 2023-08-14 VITALS — BODY MASS INDEX: 33.31 KG/M2 | HEIGHT: 63 IN | WEIGHT: 188 LBS

## 2023-08-14 DIAGNOSIS — N63.10 UNSPECIFIED LUMP IN THE RIGHT BREAST, UNSPECIFIED QUADRANT: ICD-10-CM

## 2023-08-14 PROCEDURE — 77065 MAMMO DIGITAL DIAGNOSTIC RIGHT WITH TOMO: ICD-10-PCS | Mod: 26,RT,, | Performed by: RADIOLOGY

## 2023-08-14 PROCEDURE — 76642 ULTRASOUND BREAST LIMITED: CPT | Mod: 26,RT,, | Performed by: RADIOLOGY

## 2023-08-14 PROCEDURE — 77065 DX MAMMO INCL CAD UNI: CPT | Mod: 26,RT,, | Performed by: RADIOLOGY

## 2023-08-14 PROCEDURE — 76642 US BREAST RIGHT LIMITED: ICD-10-PCS | Mod: 26,RT,, | Performed by: RADIOLOGY

## 2023-08-14 PROCEDURE — 76642 ULTRASOUND BREAST LIMITED: CPT | Mod: TC,RT

## 2023-08-14 PROCEDURE — 77061 MAMMO DIGITAL DIAGNOSTIC RIGHT WITH TOMO: ICD-10-PCS | Mod: 26,RT,, | Performed by: RADIOLOGY

## 2023-08-14 PROCEDURE — 77061 BREAST TOMOSYNTHESIS UNI: CPT | Mod: 26,RT,, | Performed by: RADIOLOGY

## 2023-08-14 PROCEDURE — 77061 BREAST TOMOSYNTHESIS UNI: CPT | Mod: TC,RT

## 2023-08-28 ENCOUNTER — OFFICE VISIT (OUTPATIENT)
Dept: FAMILY MEDICINE | Facility: CLINIC | Age: 53
End: 2023-08-28
Payer: COMMERCIAL

## 2023-08-28 VITALS
SYSTOLIC BLOOD PRESSURE: 120 MMHG | DIASTOLIC BLOOD PRESSURE: 80 MMHG | TEMPERATURE: 98 F | BODY MASS INDEX: 30.59 KG/M2 | WEIGHT: 172.63 LBS | HEIGHT: 63 IN | HEART RATE: 81 BPM | OXYGEN SATURATION: 98 %

## 2023-08-28 DIAGNOSIS — E66.9 OBESITY WITH BODY MASS INDEX 30 OR GREATER: ICD-10-CM

## 2023-08-28 PROBLEM — J30.9 ALLERGIC RHINITIS: Status: ACTIVE | Noted: 2023-08-28

## 2023-08-28 PROBLEM — F41.1 ANXIETY STATE: Status: ACTIVE | Noted: 2023-08-28

## 2023-08-28 PROBLEM — Z82.49 FAMILY HISTORY OF CORONARY ARTERY DISEASE: Status: ACTIVE | Noted: 2020-01-28

## 2023-08-28 PROBLEM — F32.A DEPRESSIVE DISORDER: Status: ACTIVE | Noted: 2021-03-30

## 2023-08-28 PROBLEM — F13.20 BENZODIAZEPINE DEPENDENCE: Status: ACTIVE | Noted: 2018-11-07

## 2023-08-28 PROBLEM — Z86.19 HISTORY OF HELICOBACTER PYLORI INFECTION: Status: ACTIVE | Noted: 2021-03-30

## 2023-08-28 PROBLEM — F41.1 GENERALIZED ANXIETY DISORDER: Status: ACTIVE | Noted: 2023-08-28

## 2023-08-28 PROBLEM — K21.9 GASTROESOPHAGEAL REFLUX DISEASE: Status: ACTIVE | Noted: 2017-11-21

## 2023-08-28 PROCEDURE — 99204 PR OFFICE/OUTPT VISIT, NEW, LEVL IV, 45-59 MIN: ICD-10-PCS | Mod: S$GLB,,, | Performed by: FAMILY MEDICINE

## 2023-08-28 PROCEDURE — 99999 PR PBB SHADOW E&M-EST. PATIENT-LVL IV: CPT | Mod: PBBFAC,,, | Performed by: FAMILY MEDICINE

## 2023-08-28 PROCEDURE — 99999 PR PBB SHADOW E&M-EST. PATIENT-LVL IV: ICD-10-PCS | Mod: PBBFAC,,, | Performed by: FAMILY MEDICINE

## 2023-08-28 PROCEDURE — 99204 OFFICE O/P NEW MOD 45 MIN: CPT | Mod: S$GLB,,, | Performed by: FAMILY MEDICINE

## 2023-08-28 NOTE — PATIENT INSTRUCTIONS
[facility] pharmacy, 280 dollars for 6 months depending on dose you need.  I will fax prescription.  Call them before going to  to be sure it is ready.  It is in [place].  [phone]    Thyroid cancer and pancreatitis are black box warnings but the risk was in rats for cancer and diabetics for pancreatitis.  Make sure to get a good bowel regimen.  I recommend stool softener daily and stimulant laxative every 2-3 days if no good bowel movement.    Titration Schedule for compound GLP-1 agonists:    12,15,18,22,25,30,35,40 units    2 weeks on each dose at least.   Stay on the same dose as long as it is working to reduce side effects/cost.   Message us once you get to 25 units, let us know if you have any side effects.     Dr. Nikita Wilcox

## 2023-08-28 NOTE — ASSESSMENT & PLAN NOTE
The patient is asked to make an attempt to improve diet and exercise patterns to aid in medical management of this problem.  glp1RA

## 2023-09-01 ENCOUNTER — PATIENT MESSAGE (OUTPATIENT)
Dept: ADMINISTRATIVE | Facility: HOSPITAL | Age: 53
End: 2023-09-01
Payer: COMMERCIAL

## 2023-09-01 ENCOUNTER — TELEPHONE (OUTPATIENT)
Dept: FAMILY MEDICINE | Facility: CLINIC | Age: 53
End: 2023-09-01
Payer: COMMERCIAL

## 2023-09-01 NOTE — TELEPHONE ENCOUNTER
----- Message from Christian Deutsch sent at 9/1/2023 10:44 AM CDT -----  Regarding: Ruma  Type:Patient Callback     Who called: Ruma     What is the request in detail: Pt stated that the pharmacy never received the prescriptions. Pt stated that she needs to the doctor to resend it. Please call the pharmacy as soon a possible.     Can the clinic reply by [facility]? Yes     Would the patient rather a call back or a response via [facility]? Callback     Best call back number: [phone]      Additional Information:

## 2023-09-05 ENCOUNTER — HOSPITAL ENCOUNTER (OUTPATIENT)
Dept: RADIOLOGY | Facility: HOSPITAL | Age: 53
Discharge: HOME | End: 2023-09-05
Attending: FAMILY MEDICINE
Payer: COMMERCIAL

## 2023-09-05 DIAGNOSIS — M79.672 LEFT FOOT PAIN: ICD-10-CM

## 2023-09-05 PROCEDURE — 73630 X-RAY EXAM OF FOOT: CPT | Mod: TC,LT

## 2023-09-12 NOTE — PROGRESS NOTES
"HISTORY OF PRESENT ILLNESS:  Ruma Dietrich is a 53 y.o. female who presents to the clinic today for Establish Care  .       She wants to continue her glp1RA  Costs is terrible  Down to 168lb on her scale from 190lbs  No side effects.  She is doing great overall    Patient Active Problem List   Diagnosis    Allergic rhinitis    Anxiety state    Benzodiazepine dependence    Depressive disorder    Family history of coronary artery disease    Gastroesophageal reflux disease    Generalized anxiety disorder    History of Helicobacter pylori infection    Insomnia    Obesity with body mass index 30 or greater           CARE TEAM:  Patient Care Team:  Nikita Wilcox MD as PCP - General (Family Medicine)  Archie Newman MD as Consulting Physician (Family Medicine)         ROS        PHYSICAL EXAM:   /80   Pulse 81   Temp 97.7 °F (36.5 °C) (Oral)   Ht 5' 3" (1.6 m)   Wt 78.3 kg (172 lb 9.9 oz)   SpO2 98%   BMI 30.58 kg/m²   BP Readings from Last 5 Encounters:   08/28/23 120/80   05/12/22 116/60   05/10/22 135/61   03/21/22 136/82   03/11/22 130/74     Wt Readings from Last 5 Encounters:   08/28/23 78.3 kg (172 lb 9.9 oz)   08/14/23 85.3 kg (188 lb)   05/11/22 85.3 kg (188 lb)   05/10/22 85.3 kg (188 lb)   03/21/22 86.2 kg (190 lb)             She appears well, in no apparent distress.  Alert and oriented times three, pleasant and cooperative. Vital signs are as documented in vital signs section.  Has central obesity noted.     Medication List with Changes/Refills   Current Medications    ESOMEPRAZOLE (NEXIUM) 40 MG CAPSULE    Take 40 mg by mouth every evening.    ESTRADIOL (ESTRACE) 1 MG TABLET    Take 1 tablet (1 mg total) by mouth once daily.    MEDROXYPROGESTERONE (PROVERA) 2.5 MG TABLET    Take 1 tablet (2.5 mg total) by mouth once daily.    MONTELUKAST (SINGULAIR) 10 MG TABLET    Take 10 mg by mouth every evening.    NAPROXEN SODIUM (ANAPROX) 550 MG TABLET    Take 1 tablet (550 mg total) by mouth 3 " (three) times daily with meals.    OXYCODONE-ACETAMINOPHEN (PERCOCET) 5-325 MG PER TABLET    Take 1 tablet by mouth every 4 (four) hours as needed for Pain.    ZOLPIDEM (AMBIEN) 10 MG TAB    Take 10 mg by mouth nightly as needed.         ASSESSMENT AND PLAN:    Problem List Items Addressed This Visit       Obesity with body mass index 30 or greater    Current Assessment & Plan     The patient is asked to make an attempt to improve diet and exercise patterns to aid in medical management of this problem.  glp1RA          [facility] pharmacy, 280 dollars for 6 months depending on dose you need.  I will fax prescription.  Call them before going to  to be sure it is ready.  It is in [place].  [phone]    Thyroid cancer and pancreatitis are black box warnings but the risk was in rats for cancer and diabetics for pancreatitis.  Make sure to get a good bowel regimen.  I recommend stool softener daily and stimulant laxative every 2-3 days if no good bowel movement.    Titration Schedule for compound GLP-1 agonists:    12,15,18,22,25,30,35,40 units    2 weeks on each dose at least.   Stay on the same dose as long as it is working to reduce side effects/cost.   Message us once you get to 25 units, let us know if you have any side effects.     No future appointments.    Follow up in about 6 months (around 2/28/2024) for assess treatment plan. or sooner as needed.     Full Thickness Lip Wedge Repair (Flap) Text: Given the location of the defect and the proximity to free margins a full thickness wedge repair was deemed most appropriate.  Using a sterile surgical marker, the appropriate repair was drawn incorporating the defect and placing the expected incisions perpendicular to the vermilion border.  The vermilion border was also meticulously outlined to ensure appropriate reapproximation during the repair.  The area thus outlined was incised through and through with a #15 scalpel blade.  The muscularis and dermis were reaproximated with deep sutures following hemostasis. Care was taken to realign the vermilion border before proceeding with the superficial closure.  Once the vermilion was realigned the superfical and mucosal closure was finished.

## 2023-11-30 ENCOUNTER — TELEPHONE (OUTPATIENT)
Dept: FAMILY MEDICINE | Facility: CLINIC | Age: 53
End: 2023-11-30

## 2023-11-30 NOTE — TELEPHONE ENCOUNTER
----- Message from Radha Maya sent at 11/30/2023  2:29 PM CST -----  Regarding: self [phone]  .Type: Patient Call Back    Who called: self    What is the request in detail: patient is requesting a call back in regards to not losing any weight from medication  semaglutide/l/carnitine 2MG/M she would to change mediacion to Trizepatide with B12 Maun[place].    Can the clinic reply by [facility]? no    Would the patient rather a call back or a response via [facility]? Call back     Best call back number [phone]

## 2023-12-01 ENCOUNTER — E-VISIT (OUTPATIENT)
Dept: FAMILY MEDICINE | Facility: CLINIC | Age: 53
End: 2023-12-01
Payer: COMMERCIAL

## 2023-12-01 DIAGNOSIS — E66.9 OBESITY WITH BODY MASS INDEX 30 OR GREATER: Primary | ICD-10-CM

## 2023-12-01 PROCEDURE — 99423 OL DIG E/M SVC 21+ MIN: CPT | Mod: ,,, | Performed by: FAMILY MEDICINE

## 2023-12-01 PROCEDURE — 99423 PR E&M, ONLINE DIGIT, EST, < 7 DAYS,  21+ MINS: ICD-10-PCS | Mod: ,,, | Performed by: FAMILY MEDICINE

## 2023-12-02 RX ORDER — TIRZEPATIDE 15 MG/.5ML
INJECTION, SOLUTION SUBCUTANEOUS
Qty: 4 PEN | Refills: 11
Start: 2023-12-02

## 2023-12-02 NOTE — PROGRESS NOTES
Patient ID: Ruma Dietrich is a 53 y.o. female.    Chief Complaint: Medication Refill    The patient initiated a request through [employer] on 12/1/2023 for evaluation and management with a chief complaint of Medication Refill     I evaluated the questionnaire submission on 12/1/2023.    [facility]    12/1/2023  2:01 PM CST - Filed by Patient   Do you agree to participate in an E-Visit? Yes   If you have any of the following symptoms, please present to your local ER or call 911:  I acknowledge   Choose the state of your primary residence [place]   What is the main issue that you would like for your doctor to address today? Weight loss   Are you able to take your vital signs? Yes   Systolic Blood Pressure: 120   Diastolic Blood Pressure: 84   Weight: 171.2   Height: 63   Pulse: 93   Temperature: 99.1   Respiration rate: 16   Pulse Oxygen: 97   Are you currently pregnant, could you be pregnant, or are you breast feeding? None of the above   Please describe your symptoms Unable to lose weight on current medication.   Where is your problem located? Abdomen area   How severe are your symptoms? Moderate   Have you had these symptoms before? Yes   How long have you been having these symptoms? For more than a month   Please list any medications or treatments you have used for your condition and indicate if it was effective or not. Semaglutide 2mg/ml combo sq injection   What makes this feel better? Na   What makes this feel worse? Na   Are these symptoms related to a condition that you currently have? No   Please describe any probable cause for these symptoms Obesity   Provide any information you feel is important to your history not asked above I am requesting my script be changed to the generic Wegovy compound at [facility].   Please attach any relevant images or files          Recent Labs Obtained:  No visits with results within 7 Day(s) from this visit.   Latest known visit with results is:   Lab  Visit on 11/25/2022   Component Date Value Ref Range Status    Stool Culture 11/25/2022 No Salmonella,Shigella,Vibrio,Campylobacter,Yersinia isolated.   Final    Stool Exam-Ova,Cysts,Parasites 11/25/2022 FINAL 11/29/2022 1119   Final    Comment: SOURCE: STOOL  OVA AND PARASITE, MICROSCOPY, F                        FINAL    No parasites seen.   Cryptosporidium, Cyclospora, and microsporidia are not   readily detected by this method.   Single negative specimen does not rule out   parasitic infection.    Test Performed by:  [facility] [place] [address]  : Nick Nelson M.D. Ph.D.; CLIA# [id number]      C. diff Antigen 11/25/2022 Negative  Negative Final    C difficile Toxins A+B, EIA 11/25/2022 Negative  Negative Final    Testing not recommended for children <24 months old.    Shiga Toxin 1 E.coli 11/25/2022 Negative   Final    Shiga Toxin 2 E.coli 11/25/2022 Negative   Final       Encounter Diagnosis   Name Primary?    Obesity with body mass index 30 or greater Yes        No orders of the defined types were placed in this encounter.     Medications Ordered This Encounter   Medications    tirzepatide (MOUNJARO) 15 mg/0.5 mL PnIj     Sig[facility]ob[facility]amin     Dispense:  4 pen      Refill:  11        Follow up in about 2 weeks (around 12/15/2023), or if symptoms worsen or fail to improve, for reassess acute condition.      E-Visit Time Tracking:    Day 1 Time (in minutes): 22     Total Time (in minutes): 22

## 2024-01-12 ENCOUNTER — HOSPITAL ENCOUNTER (OUTPATIENT)
Dept: RADIOLOGY | Facility: HOSPITAL | Age: 54
Discharge: HOME | End: 2024-01-12
Attending: NURSE PRACTITIONER
Payer: COMMERCIAL

## 2024-01-12 VITALS — WEIGHT: 172 LBS | HEIGHT: 63 IN | BODY MASS INDEX: 30.48 KG/M2

## 2024-01-12 DIAGNOSIS — Z12.39 SCREENING FOR MALIGNANT NEOPLASM OF BREAST: ICD-10-CM

## 2024-01-12 PROCEDURE — 77063 BREAST TOMOSYNTHESIS BI: CPT | Mod: 26,,, | Performed by: RADIOLOGY

## 2024-01-12 PROCEDURE — 77067 SCR MAMMO BI INCL CAD: CPT | Mod: TC

## 2024-01-12 PROCEDURE — 77067 SCR MAMMO BI INCL CAD: CPT | Mod: 26,,, | Performed by: RADIOLOGY

## 2024-02-06 DIAGNOSIS — Z12.11 COLON CANCER SCREENING: ICD-10-CM

## 2024-03-13 ENCOUNTER — HOSPITAL ENCOUNTER (OUTPATIENT)
Dept: RADIOLOGY | Facility: HOSPITAL | Age: 54
Discharge: HOME | End: 2024-03-13
Attending: FAMILY MEDICINE
Payer: COMMERCIAL

## 2024-03-13 DIAGNOSIS — M54.6 THORACIC SPINE PAIN: ICD-10-CM

## 2024-03-13 PROCEDURE — 72070 X-RAY EXAM THORAC SPINE 2VWS: CPT | Mod: TC

## 2024-03-13 PROCEDURE — 72070 X-RAY EXAM THORAC SPINE 2VWS: CPT | Mod: 26,,, | Performed by: RADIOLOGY

## 2024-03-21 ENCOUNTER — HOSPITAL ENCOUNTER (OUTPATIENT)
Dept: RADIOLOGY | Facility: HOSPITAL | Age: 54
Discharge: HOME | End: 2024-03-21
Attending: FAMILY MEDICINE
Payer: COMMERCIAL

## 2024-03-21 DIAGNOSIS — R82.2 BILIURIA: ICD-10-CM

## 2024-03-21 PROCEDURE — 74176 CT ABD & PELVIS W/O CONTRAST: CPT | Mod: TC

## 2024-03-21 PROCEDURE — 25500020 PHARM REV CODE 255: Performed by: FAMILY MEDICINE

## 2024-03-21 PROCEDURE — 74176 CT ABD & PELVIS W/O CONTRAST: CPT | Mod: 26,,, | Performed by: RADIOLOGY

## 2024-03-21 RX ADMIN — IOHEXOL 30 ML: 350 INJECTION, SOLUTION INTRAVENOUS at 11:03

## 2024-04-01 ENCOUNTER — HOSPITAL ENCOUNTER (OUTPATIENT)
Dept: RADIOLOGY | Facility: HOSPITAL | Age: 54
Discharge: HOME | End: 2024-04-01
Attending: FAMILY MEDICINE
Payer: COMMERCIAL

## 2024-04-01 DIAGNOSIS — M54.6 PAIN IN THORACIC SPINE: ICD-10-CM

## 2024-04-01 PROCEDURE — 72128 CT CHEST SPINE W/O DYE: CPT | Mod: 26,,, | Performed by: STUDENT IN AN ORGANIZED HEALTH CARE EDUCATION/TRAINING PROGRAM

## 2024-04-01 PROCEDURE — 72128 CT CHEST SPINE W/O DYE: CPT | Mod: TC

## 2024-04-16 ENCOUNTER — LAB VISIT (OUTPATIENT)
Dept: LAB | Facility: HOSPITAL | Age: 54
End: 2024-04-16
Payer: COMMERCIAL

## 2024-04-16 DIAGNOSIS — Z12.11 COLON CANCER SCREENING: ICD-10-CM

## 2024-04-16 LAB — HEMOCCULT STL QL IA: NEGATIVE

## 2024-04-16 PROCEDURE — 82274 ASSAY TEST FOR BLOOD FECAL: CPT | Performed by: FAMILY MEDICINE

## 2024-08-28 ENCOUNTER — LAB VISIT (OUTPATIENT)
Dept: LAB | Facility: HOSPITAL | Age: 54
End: 2024-08-28
Attending: FAMILY MEDICINE
Payer: COMMERCIAL

## 2024-08-28 DIAGNOSIS — E55.9 VITAMIN D DEFICIENCY: Primary | ICD-10-CM

## 2024-08-28 LAB — 25(OH)D3+25(OH)D2 SERPL-MCNC: 34 NG/ML (ref 30–96)

## 2024-08-28 PROCEDURE — 82306 VITAMIN D 25 HYDROXY: CPT | Performed by: FAMILY MEDICINE

## 2024-08-28 PROCEDURE — 36415 COLL VENOUS BLD VENIPUNCTURE: CPT | Performed by: FAMILY MEDICINE

## 2024-11-14 ENCOUNTER — HOSPITAL ENCOUNTER (OUTPATIENT)
Dept: RADIOLOGY | Facility: HOSPITAL | Age: 54
Discharge: HOME | End: 2024-11-14
Attending: FAMILY MEDICINE
Payer: COMMERCIAL

## 2024-11-14 DIAGNOSIS — R20.0 ANESTHESIA OF SKIN: ICD-10-CM

## 2024-11-14 PROCEDURE — 70551 MRI BRAIN STEM W/O DYE: CPT | Mod: 26,,, | Performed by: RADIOLOGY

## 2024-11-14 PROCEDURE — 70551 MRI BRAIN STEM W/O DYE: CPT | Mod: TC

## 2024-11-27 ENCOUNTER — OFFICE VISIT (OUTPATIENT)
Dept: URGENT CARE | Facility: CLINIC | Age: 54
End: 2024-11-27
Payer: COMMERCIAL

## 2024-11-27 VITALS
HEART RATE: 74 BPM | WEIGHT: 190.25 LBS | RESPIRATION RATE: 20 BRPM | TEMPERATURE: 98 F | BODY MASS INDEX: 33.71 KG/M2 | DIASTOLIC BLOOD PRESSURE: 82 MMHG | HEIGHT: 63 IN | SYSTOLIC BLOOD PRESSURE: 154 MMHG | OXYGEN SATURATION: 98 %

## 2024-11-27 DIAGNOSIS — R30.0 DYSURIA: ICD-10-CM

## 2024-11-27 DIAGNOSIS — N20.0 KIDNEY STONE: Primary | ICD-10-CM

## 2024-11-27 LAB
BILIRUBIN, UA POC OHS: NEGATIVE
BLOOD, UA POC OHS: ABNORMAL
CLARITY, UA POC OHS: CLEAR
COLOR, UA POC OHS: YELLOW
GLUCOSE, UA POC OHS: NEGATIVE
KETONES, UA POC OHS: NEGATIVE
LEUKOCYTES, UA POC OHS: NEGATIVE
NITRITE, UA POC OHS: NEGATIVE
PH, UA POC OHS: 6
PROTEIN, UA POC OHS: ABNORMAL
SPECIFIC GRAVITY, UA POC OHS: >=1.03
UROBILINOGEN, UA POC OHS: 0.2

## 2024-11-27 PROCEDURE — 74019 RADEX ABDOMEN 2 VIEWS: CPT | Mod: S$GLB,,, | Performed by: RADIOLOGY

## 2024-11-27 PROCEDURE — 87086 URINE CULTURE/COLONY COUNT: CPT | Performed by: PHYSICIAN ASSISTANT

## 2024-11-27 RX ORDER — TAMSULOSIN HYDROCHLORIDE 0.4 MG/1
0.4 CAPSULE ORAL DAILY PRN
Qty: 30 CAPSULE | Refills: 0 | Status: SHIPPED | OUTPATIENT
Start: 2024-11-27 | End: 2025-11-27

## 2024-11-27 RX ORDER — KETOROLAC TROMETHAMINE 30 MG/ML
30 INJECTION, SOLUTION INTRAMUSCULAR; INTRAVENOUS
Status: COMPLETED | OUTPATIENT
Start: 2024-11-27 | End: 2024-11-27

## 2024-11-27 RX ORDER — CITALOPRAM 20 MG/1
20 TABLET, FILM COATED ORAL DAILY
COMMUNITY

## 2024-11-27 RX ORDER — MELOXICAM 15 MG/1
15 TABLET ORAL
COMMUNITY

## 2024-11-27 RX ORDER — TAMSULOSIN HYDROCHLORIDE 0.4 MG/1
0.4 CAPSULE ORAL DAILY PRN
Qty: 30 CAPSULE | Refills: 0 | Status: SHIPPED | OUTPATIENT
Start: 2024-11-27 | End: 2024-11-27

## 2024-11-27 RX ADMIN — KETOROLAC TROMETHAMINE 30 MG: 30 INJECTION, SOLUTION INTRAMUSCULAR; INTRAVENOUS at 08:11

## 2024-11-28 NOTE — PROGRESS NOTES
"Subjective:      Patient ID: Ruma Dietrich is a 54 y.o. female.    Vitals:  height is 5' 3" (1.6 m) and weight is 86.3 kg (190 lb 4.1 oz). Her oral temperature is 98 °F (36.7 °C). Her blood pressure is 154/82 (abnormal) and her pulse is 74. Her respiration is 20 and oxygen saturation is 98%.     Chief Complaint: Dysuria      54-year-old female with a history of anxiety, right kidney stone, GERD, obesity on Mounjaro, hysterectomy, and other comorbidities who presents to urgent care clinic for evaluation.  Complain of symptoms for one-week with urinary urgency, frequency, dysuria, and right flank pain.  Tried Pyridium OTC with no significant relief.  Earlier today pain was 8/10 but now 3/10.  No fever, chills, nausea, vomiting, weakness, hematuria, or vaginal discharge.  Has been only drinking 1 bottle water at work since she has busy.  No other associated symptoms.  She was told she had kidney stone 2 years ago but it has never bothered her.  Has meloxicam 15 mg available at home but only taking on as needed basis.    Dysuria   This is a new problem. The current episode started 1 to 4 weeks ago (1 week). The problem occurs every urination. The problem has been gradually worsening. The quality of the pain is described as burning and aching. The pain is at a severity of 3/10. The pain is mild. There has been no fever. She is Sexually active. Associated symptoms include flank pain, frequency and urgency. Pertinent negatives include no behavior changes, chills, discharge, hematuria, hesitancy, nausea, possible pregnancy, sweats, vomiting, weight loss, bubble bath use, constipation, rash or withholding. Treatments tried: AZO. The treatment provided no relief. Her past medical history is significant for kidney stones.       Constitution: Negative for activity change, appetite change, chills, sweating, fatigue, fever and generalized weakness.   HENT:  Negative for ear pain, hearing loss, facial swelling, congestion, " postnasal drip, sinus pain, sinus pressure, sore throat, trouble swallowing and voice change.    Neck: Negative for neck pain, neck stiffness and painful lymph nodes.   Cardiovascular:  Negative for chest pain, leg swelling, palpitations, sob on exertion and passing out.   Eyes:  Negative for eye discharge, eye pain, photophobia, vision loss, double vision and blurred vision.   Respiratory:  Negative for chest tightness, cough, sputum production, bloody sputum, COPD, shortness of breath, stridor, wheezing and asthma.    Gastrointestinal:  Negative for abdominal pain, nausea, vomiting, constipation, diarrhea, bright red blood in stool, rectal bleeding, heartburn and bowel incontinence.   Genitourinary:  Positive for dysuria, frequency, urgency, flank pain and history of kidney stones. Negative for urine decreased, bladder incontinence, hematuria, vaginal pain, vaginal discharge, vaginal bleeding, vaginal odor, painful intercourse, genital sore and pelvic pain.   Musculoskeletal:  Negative for trauma, joint pain, joint swelling, abnormal ROM of joint, muscle cramps and muscle ache.   Skin:  Negative for color change, pale, rash and wound.   Allergic/Immunologic: Negative for seasonal allergies, asthma and immunocompromised state.   Neurological:  Negative for dizziness, history of vertigo, light-headedness, passing out, facial drooping, speech difficulty, coordination disturbances, loss of balance, headaches, disorientation, altered mental status, loss of consciousness, numbness, tingling and seizures.   Hematologic/Lymphatic: Negative for swollen lymph nodes, easy bruising/bleeding and trouble clotting. Does not bruise/bleed easily.   Psychiatric/Behavioral:  Negative for altered mental status and disorientation.       Objective:     Physical Exam   Constitutional: She appears well-developed. She is cooperative.  Non-toxic appearance. She does not appear ill. No distress.   HENT:   Head: Normocephalic and atraumatic.    Ears:   Right Ear: Hearing, external ear and ear canal normal.   Left Ear: Hearing, external ear and ear canal normal.   Nose: Nose normal.   Mouth/Throat: Oropharynx is clear and moist. Mucous membranes are moist. Oropharynx is clear.   Eyes: Conjunctivae, EOM and lids are normal. Right eye exhibits no discharge. Left eye exhibits no discharge. vision grossly intact gaze aligned appropriately   Neck: Neck supple. No neck rigidity present.   Cardiovascular: Normal rate, regular rhythm and normal pulses.   Pulmonary/Chest: Effort normal. No accessory muscle usage. No respiratory distress. She has no wheezes. She exhibits no tenderness.   Abdominal: Normal appearance. She exhibits no distension. There is no abdominal tenderness. There is no guarding, no left CVA tenderness and no right CVA tenderness.   Musculoskeletal: Normal range of motion.         General: Normal range of motion.      Right lower leg: No edema.      Left lower leg: No edema.      Comments: Moves all extremities with normal tone, strength, and ROM.   Neurological: no focal deficit. She is alert and at baseline. She has normal motor skills and normal sensation. She displays no weakness, facial symmetry and normal reflexes. No cranial nerve deficit or sensory deficit. She exhibits normal muscle tone. Gait and coordination normal. Coordination normal.   Skin: Skin is warm, dry, not diaphoretic and no rash. Capillary refill takes less than 2 seconds.   Psychiatric: Her behavior is normal. Mood and thought content normal.   Nursing note and vitals reviewed.    Results for orders placed or performed in visit on 11/27/24   POCT Urinalysis(Instrument)    Collection Time: 11/27/24  7:39 PM   Result Value Ref Range    Color, POC UA Yellow Yellow, Straw, Colorless    Clarity, POC UA Clear Clear    Glucose, POC UA Negative Negative    Bilirubin, POC UA Negative Negative    Ketones, POC UA Negative Negative    Spec Grav POC UA >=1.030 1.005 - 1.030    Blood,  POC UA Large (A) Negative    pH, POC UA 6.0 5.0 - 8.0    Protein, POC UA Trace (A) Negative    Urobilinogen, POC UA 0.2 <=1.0    Nitrite, POC UA Negative Negative    WBC, POC UA Negative Negative       X-Ray Abdomen Flat And Erect    Result Date: 11/27/2024  EXAMINATION: XR ABDOMEN FLAT AND ERECT CLINICAL HISTORY: Dysuria TECHNIQUE: Flat and erect AP views of the abdomen were performed. COMPARISON: CT of the abdomen and pelvis from 03/21/2024. FINDINGS: There is a normal, nonobstructive bowel gas pattern.  There is no free air.  There is no abnormal calcification. The visualized osseous structures are unremarkable. The visualized lung bases are clear.     No acute abnormality. Electronically signed by: Tadeo Marlow Date:    11/27/2024 Time:    19:57        Assessment:     1. Kidney stone    2. Dysuria      Note dictated with voice recognition software, please excuse any grammatical errors.    Patient presents with clinical exam findings and history consistent with above.  We discussed the differential diagnosis.    On exam, patient is nontoxic appearing and vitals are stable.  Patient is essentially neurovascularly intact on exam.  No systemic symptoms on exam.  Tolerating fluids.    Diagnostic testing results were independently reviewed and interpreted, which were discussed in depth with patient.   Test ordered in clinic:  Urinalysis with large blood and trace protein which is consistent with kidney stones   Urine culture sent.  Abdominal x-rays will no free air or kidney stones.  Nonobstructive bowel gas pattern present.  Additionally, previous progress notes/admissions/lab were reviewed and interpreted.  [facility] 05/12/2022 and 05/10/2022 with Good kidney function and EGFR.  CT abdomen and pelvis 03/21/2024 results and imaging reviewed which showed small nonobstructing kidney stone on right that is less than 3 mm.      Plan:     Note dictated with voice recognition software, please excuse any grammatical  errors.    Patient was given Toradol injection clinic for the pain.  She was given urinary had to strain at home.Patient was prescribed Flomax; she already has meloxicam 15 mg as needed for pain at home.  She recommended OTC treatments for their symptoms. Patient was recommended increasing fluid intake.  Referral to Urology if no improvements with conservative treatment.  If symptoms do not improve/worsens, patient was referred back to PCP for continued outpatient workup and management.     Patient was instructed to return for re-evaluation for any worsening or change in current symptoms. Strict ED versus clinic precautions given in depth. Discharge and follow-up instructions given verbally/printed with the patient who expressed understanding and willingness to comply with my recommendations.  Patient verbalized understanding and agreed with the entirety of plan of care.    Kidney stone  -     CULTURE, URINE  -     X-Ray Abdomen Flat And Erect; Future; Expected date: 11/27/2024  -     Discontinue: tamsulosin (FLOMAX) 0.4 mg Cap; Take 1 capsule (0.4 mg total) by mouth daily as needed (kidney stibes).  Dispense: 30 capsule; Refill: 0  -     Ambulatory referral/consult to Urology  -     ketorolac injection 30 mg  -     tamsulosin (FLOMAX) 0.4 mg Cap; Take 1 capsule (0.4 mg total) by mouth daily as needed (kidney stibes).  Dispense: 30 capsule; Refill: 0    Dysuria  -     POCT Urinalysis(Instrument)  -     CULTURE, URINE  -     X-Ray Abdomen Flat And Erect; Future; Expected date: 11/27/2024  -     Discontinue: tamsulosin (FLOMAX) 0.4 mg Cap; Take 1 capsule (0.4 mg total) by mouth daily as needed (kidney stibes).  Dispense: 30 capsule; Refill: 0  -     Ambulatory referral/consult to Urology  -     ketorolac injection 30 mg  -     tamsulosin (FLOMAX) 0.4 mg Cap; Take 1 capsule (0.4 mg total) by mouth daily as needed (kidney stibes).  Dispense: 30 capsule; Refill: 0             Additional MDM:     Heart Failure Score:    COPD = No

## 2024-11-28 NOTE — PATIENT INSTRUCTIONS
PLEASE READ YOUR DISCHARGE INSTRUCTIONS ENTIRELY AS IT CONTAINS IMPORTANT INFORMATION.  -increase fluid intake to at least 190 fluid oz per day.  May add Flomax once a day as needed if you have decreased urine output.  May take anti-inflammatory with food as needed for severe pain.  Consider taking Pepcid to help with GI protection.  Do not take OTC anti-inflammatory if you are taking a prescribed anti-inflammatory (meloxicam).  -A urine culture was sent. You will be contacted once it results and appropriate action will be taken if needed.  We will notify patient of the results in the next 3-5 days; can receive results on [place].   - Take the pyridium three times a day with meals for 3 days. It will turn your urine orange. You do not need to take the whole prescription you can stop this once the pain is better and finish out the antibiotics  - Drink plenty of fluids, wipe front to back, take showers not baths, no scented soaps, wear breathable cotton underwear, urinate after sexual intercourse.   - Tylenol or Ibuprofen as directed as needed for pain.    -You must understand that you've received an Urgent Care treatment only and that you may be released before all your medical problems are known or treated. You, the patient, will arrange for follow up care as instructed. Please arrange follow up with your primary medical clinic within 2-5 days if your signs and symptoms have not resolved or worsen. Please go to the ER for worsening symptoms including worsening fever, flank pain, vomiting, unable to tolerate fluids, fatigue, etc.   - Follow up with your PCP or specialty clinic as directed in next 2-5 days for re-evaluation.  You can call [phone] to schedule an appointment with the appropriate provider.    - If your condition worsens or fails to improve we recommend that you receive another evaluation at the emergency room immediately or contact your primary medical clinic to discuss your concerns.     RED  FLAGS/WARNING SYMPTOMS DISCUSSED WITH PATIENT THAT WOULD WARRANT EMERGENT MEDICAL ATTENTION. Patient aware and verbalized understanding.    Follow-up care  Call your healthcare provider if all symptoms are not gone after 3 days of treatment. This is especially important if you have repeat infections.  If a culture was done, you will be told if your treatment needs to be changed. If directed, you can call to find out the results.  If X-rays were done, you will be told if the results will affect your treatment.  Call 911  Call 911 if any of the following occur:  Trouble breathing  Hard to wake up or confusion  Fainting or loss of consciousness  Rapid heart rate  When to seek medical advice  Call your healthcare provider right away if any of these occur:  Fever of 100.4ºF (38.0ºC) or higher, or as directed by your healthcare provider  Symptoms are not better by the third day of treatment  Back or belly (abdominal) pain that gets worse  Repeated vomiting, or unable to keep medicine down  Weakness or dizziness  Vaginal discharge  Pain, redness, or swelling in the outer vaginal area (labia)  Date Last Reviewed: 10/1/2016  © 4102-9012 [employer]. [address] [place] [address]. All rights reserved. This information is not intended as a substitute for professional medical care. Always follow your healthcare professional's instructions.

## 2024-11-30 LAB — BACTERIA UR CULT: NO GROWTH

## 2024-12-10 ENCOUNTER — PATIENT MESSAGE (OUTPATIENT)
Dept: URGENT CARE | Facility: CLINIC | Age: 54
End: 2024-12-10
Payer: COMMERCIAL

## 2024-12-18 ENCOUNTER — OFFICE VISIT (OUTPATIENT)
Dept: FAMILY MEDICINE | Facility: CLINIC | Age: 54
End: 2024-12-18
Payer: COMMERCIAL

## 2024-12-18 VITALS
SYSTOLIC BLOOD PRESSURE: 126 MMHG | DIASTOLIC BLOOD PRESSURE: 84 MMHG | OXYGEN SATURATION: 97 % | HEART RATE: 80 BPM | BODY MASS INDEX: 34.42 KG/M2 | TEMPERATURE: 98 F | WEIGHT: 194.25 LBS | HEIGHT: 63 IN

## 2024-12-18 DIAGNOSIS — E66.9 OBESITY WITH BODY MASS INDEX 30 OR GREATER: ICD-10-CM

## 2024-12-18 DIAGNOSIS — Z12.31 ENCOUNTER FOR SCREENING MAMMOGRAM FOR BREAST CANCER: Primary | ICD-10-CM

## 2024-12-18 PROCEDURE — G2211 COMPLEX E/M VISIT ADD ON: HCPCS | Mod: S$GLB,,, | Performed by: FAMILY MEDICINE

## 2024-12-18 PROCEDURE — 99999 PR PBB SHADOW E&M-EST. PATIENT-LVL III: CPT | Mod: PBBFAC,,, | Performed by: FAMILY MEDICINE

## 2024-12-18 PROCEDURE — 99214 OFFICE O/P EST MOD 30 MIN: CPT | Mod: S$GLB,,, | Performed by: FAMILY MEDICINE

## 2024-12-18 RX ORDER — ESTRADIOL 1 MG/1
1.5 TABLET ORAL DAILY
COMMUNITY
Start: 2022-05-27

## 2024-12-18 RX ORDER — ONDANSETRON 8 MG/1
8 TABLET, ORALLY DISINTEGRATING ORAL 2 TIMES DAILY
Qty: 20 TABLET | Refills: 1 | Status: SHIPPED | OUTPATIENT
Start: 2024-12-18 | End: 2024-12-20 | Stop reason: SDUPTHER

## 2024-12-18 NOTE — PROGRESS NOTES
"HISTORY OF PRESENT ILLNESS:  Ruma Dietrich is a 54 y.o. female who presents to the clinic today for Weight Loss  .       Wants to get back on shot  She did well  Life changes and she couldn't make it back into care  Good success   No side effects.    Patient Active Problem List   Diagnosis    Allergic rhinitis    Anxiety state    Benzodiazepine dependence    Depressive disorder    Family history of coronary artery disease    Gastroesophageal reflux disease    Generalized anxiety disorder    History of Helicobacter pylori infection    Insomnia    Obesity with body mass index 30 or greater           CARE TEAM:  Patient Care Team:  Nikita Wilcox MD as PCP - General (Family Medicine)  Archie Newman MD as Consulting Physician (Family Medicine)         ROS        PHYSICAL EXAM:   /84   Pulse 80   Temp 97.6 °F (36.4 °C) (Oral)   Ht 5' 3" (1.6 m)   Wt 88.1 kg (194 lb 3.6 oz)   LMP 02/28/2022 (Approximate)   SpO2 97%   BMI 34.41 kg/m²   BP Readings from Last 5 Encounters:   12/18/24 126/84   11/27/24 (!) 154/82   08/28/23 120/80   05/12/22 116/60   05/10/22 135/61     Wt Readings from Last 5 Encounters:   12/18/24 88.1 kg (194 lb 3.6 oz)   11/27/24 86.3 kg (190 lb 4.1 oz)   01/12/24 78 kg (172 lb)   08/28/23 78.3 kg (172 lb 9.9 oz)   08/14/23 85.3 kg (188 lb)             She appears well, in no apparent distress.  Alert and oriented times three, pleasant and cooperative. Vital signs are as documented in vital signs section.  Thyroid not palpable, not enlarged, no nodules detected.  S1 and S2 normal, no murmurs, clicks, gallops or rubs. Regular rate and rhythm. Chest is clear; no wheezes or rales. No edema or JVD.  Obesity noted  Mainly in central     Medication List with Changes/Refills   New Medications    TIRZEPATIDE 15 MG/0.5 ML PNIJ    Compounded equivalent 20 mg/ml, inject 10 units SC weekly to start, maximum dose 75 units SC weekly   Current Medications    CITALOPRAM (CELEXA) 20 MG TABLET    " Take 20 mg by mouth once daily.    ESOMEPRAZOLE (NEXIUM) 40 MG CAPSULE    Take 40 mg by mouth every evening.    ESTRADIOL (ESTRACE) 1 MG TABLET    Take 1.5 tablets by mouth once daily.    MELOXICAM (MOBIC) 15 MG TABLET    Take 15 mg by mouth as needed for Pain.    ZOLPIDEM (AMBIEN) 10 MG TAB    Take 10 mg by mouth nightly as needed.   Discontinued Medications    TAMSULOSIN (FLOMAX) 0.4 MG CAP    Take 1 capsule (0.4 mg total) by mouth daily as needed (kidney stibes).    TIRZEPATIDE (MOUNJARO) 15 MG/0.5 ML PNIJ    Titrated dose from [facility] pharmacy with hydroxycobolamin         ASSESSMENT AND PLAN:    Problem List Items Addressed This Visit       Obesity with body mass index 30 or greater     Other Visit Diagnoses       Encounter for screening mammogram for breast cancer    -  Primary    Relevant Orders    Mammo Digital Screening Bilat w/ [facility]              Future Appointments   Date Time Provider Department Center   12/26/2024  3:30 PM Louie Velasco MD [facility] UROLOGY [facility]   3/21/2025 10:00 AM Nikita Wilcox MD [facility]       Follow up in about 3 months (around 3/18/2025) for assess treatment plan. or sooner as needed.

## 2024-12-20 RX ORDER — ONDANSETRON 8 MG/1
8 TABLET, ORALLY DISINTEGRATING ORAL 2 TIMES DAILY
Qty: 20 TABLET | Refills: 1 | Status: SHIPPED | OUTPATIENT
Start: 2024-12-20

## 2024-12-20 NOTE — TELEPHONE ENCOUNTER
----- Message from Adele sent at 12/20/2024  2:29 PM CST -----  Who called:self     What is the request in detail:pt says the ondansetron (ZOFRAN-ODT) 8 MG TbDLis not availble at [facility] [place] [facility] [place] she want to know can you send it to [facility] [facility] [address] [place] [address] [phone]             Can the clinic reply by [facility]?     Would the patient rather a call back or a response via [facility]     Best call back number:     Additional Information:

## 2024-12-20 NOTE — TELEPHONE ENCOUNTER
No care due was identified.  [facility] Embedded Care Due Messages. Reference number: [id number].   12/20/2024 2:40:17 PM CST

## 2025-03-21 ENCOUNTER — OFFICE VISIT (OUTPATIENT)
Dept: FAMILY MEDICINE | Facility: CLINIC | Age: 55
End: 2025-03-21
Payer: COMMERCIAL

## 2025-03-21 VITALS
DIASTOLIC BLOOD PRESSURE: 68 MMHG | TEMPERATURE: 98 F | HEART RATE: 68 BPM | OXYGEN SATURATION: 98 % | HEIGHT: 63 IN | SYSTOLIC BLOOD PRESSURE: 118 MMHG | BODY MASS INDEX: 30.94 KG/M2 | WEIGHT: 174.63 LBS

## 2025-03-21 DIAGNOSIS — Z00.00 ANNUAL PHYSICAL EXAM: Primary | ICD-10-CM

## 2025-03-21 PROCEDURE — 99999 PR PBB SHADOW E&M-EST. PATIENT-LVL III: CPT | Mod: PBBFAC,,, | Performed by: FAMILY MEDICINE

## 2025-03-21 PROCEDURE — 99396 PREV VISIT EST AGE 40-64: CPT | Mod: S$GLB,,, | Performed by: FAMILY MEDICINE

## 2025-03-21 RX ORDER — KETOCONAZOLE 20 MG/ML
SHAMPOO, SUSPENSION TOPICAL
COMMUNITY
Start: 2024-12-22

## 2025-03-21 RX ORDER — GABAPENTIN 300 MG/1
300 CAPSULE ORAL NIGHTLY PRN
COMMUNITY
Start: 2025-01-06 | End: 2025-03-24 | Stop reason: ALTCHOICE

## 2025-03-25 NOTE — PROGRESS NOTES
"Chief Complaint   Patient presents with    Follow-up       SUBJECTIVE:   55 y.o. female for annual routine checkup.    Current Medications[1]  Allergies: Sulfamethoxazole-trimethoprim, Clarithromycin, Penicillins, and Sulfa (sulfonamide antibiotics)   Patient's last menstrual period was 02/28/2022 (approximate).    ROS:  Feeling well. No dyspnea or chest pain on exertion.  No abdominal pain, change in bowel habits, black or bloody stools.  No urinary tract symptoms. GYN ROS: no abnormal bleeding. No neurological complaints.    OBJECTIVE:   The patient appears well, alert, oriented x 3, in no distress.  /68   Pulse 68   Temp 98.1 °F (36.7 °C) (Oral)   Ht 5' 3" (1.6 m)   Wt 79.2 kg (174 lb 9.7 oz)   LMP 02/28/2022 (Approximate)   SpO2 98%   BMI 30.93 kg/m²   Wt Readings from Last 5 Encounters:   03/21/25 79.2 kg (174 lb 9.7 oz)   12/18/24 88.1 kg (194 lb 3.6 oz)   11/27/24 86.3 kg (190 lb 4.1 oz)   01/12/24 78 kg (172 lb)   08/28/23 78.3 kg (172 lb 9.9 oz)       ENT normal.  Neck supple. No adenopathy or thyromegaly. JOHNNIE. Lungs are clear, good air entry, no wheezes, rhonchi or rales. S1 and S2 normal, no murmurs, regular rate and rhythm. Abdomen soft without tenderness, guarding, mass or organomegaly. Extremities show no edema, normal peripheral pulses. Neurological is normal, no focal findings.  Weight loss noted    BREAST EXAM: deferred    PELVIC EXAM: deferred    ASSESSMENT:   1. Annual physical exam          PLAN:   Counseled on age appropriate medical preventative services, including age appropriate cancer screenings, over all nutritional health, need for a consistent exercise regimen and an over all push towards maintaining a vigorous and active lifestyle.  Counseled on age appropriate vaccines and discussed upcoming health care needs based on age/gender.  Spent time with patient counseling on need for a good patient/doctor relationship moving forward.  Discussed use of common OTC medications and " supplements.  Discussed common dietary aids and use of caffeine and the need for good sleep hygiene and stress management.    Problem List Items Addressed This Visit    None  Visit Diagnoses         Annual physical exam    -  Primary          Continue [id number]    F/u in 1 year for wellness         [1]   Current Outpatient Medications   Medication Sig Dispense Refill    esomeprazole (NEXIUM) 40 MG capsule Take 40 mg by mouth every evening.      estradioL (ESTRACE) 1 MG tablet Take 1.5 tablets by mouth once daily.      ketoconazole (NIZORAL) 2 % shampoo LATHER INTO SCALP EVERY OTHER NIGHT AT BEDTIME THEN RINSE AFTER 5 MINUTES      meloxicam (MOBIC) 15 MG tablet Take 15 mg by mouth as needed for Pain.      ondansetron (ZOFRAN-ODT) 8 MG TbDL Take 1 tablet (8 mg total) by mouth 2 (two) times daily. 20 tablet 1    tirzepatide 15 mg/0.5 mL PnIj Compounded equivalent 20 mg/ml, inject 10 units SC weekly to start, maximum dose 75 units SC weekly 3 mL 5    zolpidem (AMBIEN) 10 mg Tab Take 10 mg by mouth nightly as needed.       No current facility-administered medications for this visit.

## 2025-03-26 ENCOUNTER — HOSPITAL ENCOUNTER (OUTPATIENT)
Dept: RADIOLOGY | Facility: HOSPITAL | Age: 55
Discharge: HOME | End: 2025-03-26
Payer: COMMERCIAL

## 2025-03-26 DIAGNOSIS — M79.644 PAIN IN FINGER OF RIGHT HAND: ICD-10-CM

## 2025-03-26 PROCEDURE — 73140 X-RAY EXAM OF FINGER(S): CPT | Mod: TC,RT

## 2025-03-27 ENCOUNTER — TELEPHONE (OUTPATIENT)
Dept: FAMILY MEDICINE | Facility: CLINIC | Age: 55
End: 2025-03-27
Payer: COMMERCIAL

## 2025-03-27 ENCOUNTER — PATIENT MESSAGE (OUTPATIENT)
Dept: FAMILY MEDICINE | Facility: CLINIC | Age: 55
End: 2025-03-27
Payer: COMMERCIAL

## 2025-03-27 ENCOUNTER — RESULTS FOLLOW-UP (OUTPATIENT)
Dept: FAMILY MEDICINE | Facility: CLINIC | Age: 55
End: 2025-03-27

## 2025-03-27 DIAGNOSIS — S62.602A CLOSED NONDISPLACED FRACTURE OF PHALANX OF RIGHT MIDDLE FINGER, UNSPECIFIED PHALANX, INITIAL ENCOUNTER: Primary | ICD-10-CM

## 2025-03-27 NOTE — TELEPHONE ENCOUNTER
----- Message from Nikita Wilcox MD sent at 3/27/2025  8:09 AM CDT -----  Let her know it is fractured  Will send her to ortho  ----- Message -----  From: Interface, Rad Results In  Sent: 3/27/2025   8:04 AM CDT  To: Nikita Wilcox MD

## 2025-08-06 ENCOUNTER — PATIENT MESSAGE (OUTPATIENT)
Dept: FAMILY MEDICINE | Facility: CLINIC | Age: 55
End: 2025-08-06
Payer: COMMERCIAL

## (undated) DEVICE — SEE L#133928

## (undated) DEVICE — PAD SANITARY OB STERILE

## (undated) DEVICE — SLEEVE LITE DEVON

## (undated) DEVICE — SKIN MARKER DEVON 160

## (undated) DEVICE — SEE MEDLINE ITEM 152622

## (undated) DEVICE — SHEET DRAPE FAN-FOLDED 3/4

## (undated) DEVICE — DRESSING TELFA N ADH 3X8

## (undated) DEVICE — SEE MEDLINE ITEM 157117

## (undated) DEVICE — DRAPE UNDER BUTTOCKS WITH POUCH

## (undated) DEVICE — SEE MEDLINE ITEM 157181

## (undated) DEVICE — GLOVE BIOGEL ECLIPSE SZ 7.5

## (undated) DEVICE — DEVICE ABLATION NOVASURE DISP

## (undated) DEVICE — GOWN SMART IMP BREATHABLE XXLG

## (undated) DEVICE — TOWELS STERILE 18 X 25.5

## (undated) DEVICE — KIT PREP E-Z WET W/2-6

## (undated) DEVICE — GLOVE BIOGEL SKINSENSE PI 7.5

## (undated) DEVICE — SPONGE GAUZE 16PLY 4X4

## (undated) DEVICE — SOL WATER STRL IRR 1000ML

## (undated) DEVICE — GLOVE EXAM VINYL PF X-LG

## (undated) DEVICE — LEGGINGS 48X31 INCH

## (undated) DEVICE — SEE MEDLINE ITEM 146313

## (undated) DEVICE — SPONGE LAP 18X18 PREWASHED

## (undated) DEVICE — PACK SET UP CONVERTORS

## (undated) DEVICE — COVER LIGHT HANDLE 80/CA

## (undated) DEVICE — LABEL FOR UTILITY MARKER